# Patient Record
Sex: FEMALE | Race: WHITE | Employment: FULL TIME | URBAN - NONMETROPOLITAN AREA
[De-identification: names, ages, dates, MRNs, and addresses within clinical notes are randomized per-mention and may not be internally consistent; named-entity substitution may affect disease eponyms.]

---

## 2017-05-15 ENCOUNTER — APPOINTMENT (OUTPATIENT)
Dept: GENERAL RADIOLOGY | Age: 27
End: 2017-05-15
Payer: OTHER MISCELLANEOUS

## 2017-05-15 ENCOUNTER — HOSPITAL ENCOUNTER (EMERGENCY)
Age: 27
Discharge: HOME | End: 2017-05-15
Payer: OTHER MISCELLANEOUS

## 2017-05-15 VITALS
DIASTOLIC BLOOD PRESSURE: 90 MMHG | TEMPERATURE: 98.2 F | HEART RATE: 130 BPM | RESPIRATION RATE: 20 BRPM | SYSTOLIC BLOOD PRESSURE: 143 MMHG | BODY MASS INDEX: 47.13 KG/M2 | HEIGHT: 63 IN | WEIGHT: 266 LBS | OXYGEN SATURATION: 97 %

## 2017-05-15 DIAGNOSIS — S50.02XA CONTUSION OF LEFT ELBOW, INITIAL ENCOUNTER: ICD-10-CM
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
## 2017-05-15 DIAGNOSIS — S16.1XXA CERVICAL STRAIN, INITIAL ENCOUNTER: ICD-10-CM

## 2017-05-15 DIAGNOSIS — S80.02XA CONTUSION OF LEFT KNEE, INITIAL ENCOUNTER: ICD-10-CM

## 2017-05-15 PROCEDURE — 73560 X-RAY EXAM OF KNEE 1 OR 2: CPT

## 2017-05-15 PROCEDURE — 99284 EMERGENCY DEPT VISIT MOD MDM: CPT

## 2017-05-15 PROCEDURE — 99283 EMERGENCY DEPT VISIT LOW MDM: CPT | Performed by: PHYSICIAN ASSISTANT

## 2017-05-15 PROCEDURE — 96372 THER/PROPH/DIAG INJ SC/IM: CPT

## 2017-05-15 PROCEDURE — 73080 X-RAY EXAM OF ELBOW: CPT

## 2017-05-15 PROCEDURE — 6360000002 HC RX W HCPCS: Performed by: PHYSICIAN ASSISTANT

## 2017-05-15 PROCEDURE — 72040 X-RAY EXAM NECK SPINE 2-3 VW: CPT

## 2017-05-15 RX ORDER — ORPHENADRINE CITRATE 30 MG/ML
60 INJECTION INTRAMUSCULAR; INTRAVENOUS ONCE
Status: COMPLETED | OUTPATIENT
Start: 2017-05-15 | End: 2017-05-15

## 2017-05-15 RX ORDER — PHENTERMINE HYDROCHLORIDE 37.5 MG/1
37.5 CAPSULE ORAL EVERY MORNING
COMMUNITY

## 2017-05-15 RX ORDER — ONDANSETRON 4 MG/1
4 TABLET, ORALLY DISINTEGRATING ORAL EVERY 8 HOURS PRN
Qty: 15 TABLET | Refills: 0 | Status: SHIPPED | OUTPATIENT
Start: 2017-05-15

## 2017-05-15 RX ORDER — ETONOGESTREL AND ETHINYL ESTRADIOL 11.7; 2.7 MG/1; MG/1
1 INSERT, EXTENDED RELEASE VAGINAL SEE ADMIN INSTRUCTIONS
COMMUNITY

## 2017-05-15 RX ORDER — CYCLOBENZAPRINE HCL 10 MG
10 TABLET ORAL 3 TIMES DAILY PRN
Qty: 15 TABLET | Refills: 0 | Status: SHIPPED | OUTPATIENT
Start: 2017-05-15

## 2017-05-15 RX ORDER — OXYCODONE HYDROCHLORIDE AND ACETAMINOPHEN 5; 325 MG/1; MG/1
1 TABLET ORAL EVERY 4 HOURS PRN
Qty: 12 TABLET | Refills: 0 | Status: SHIPPED | OUTPATIENT
Start: 2017-05-15

## 2017-05-15 RX ORDER — NAPROXEN 500 MG/1
500 TABLET ORAL 2 TIMES DAILY
Qty: 20 TABLET | Refills: 0 | Status: SHIPPED | OUTPATIENT
Start: 2017-05-15

## 2017-05-15 RX ADMIN — ORPHENADRINE CITRATE 60 MG: 30 INJECTION INTRAMUSCULAR; INTRAVENOUS at 18:58

## 2017-05-15 ASSESSMENT — ENCOUNTER SYMPTOMS
ABDOMINAL PAIN: 0
SHORTNESS OF BREATH: 0
COUGH: 0
VOMITING: 0
BACK PAIN: 0
CONSTIPATION: 0
WHEEZING: 0
DIARRHEA: 0
NAUSEA: 0

## 2017-09-12 RX ORDER — ETONOGESTREL/ETHINYL ESTRADIOL .12-.015MG
RING, VAGINAL VAGINAL
Qty: 1 EACH | Refills: 0 | OUTPATIENT
Start: 2017-09-12

## 2017-09-13 RX ORDER — ETONOGESTREL AND ETHINYL ESTRADIOL 11.7; 2.7 MG/1; MG/1
1 INSERT, EXTENDED RELEASE VAGINAL
Qty: 1 EACH | Refills: 0 | Status: SHIPPED | OUTPATIENT
Start: 2017-09-13 | End: 2017-10-13 | Stop reason: SDUPTHER

## 2017-10-13 ENCOUNTER — DOCUMENTATION (OUTPATIENT)
Dept: OBSTETRICS AND GYNECOLOGY | Facility: CLINIC | Age: 27
End: 2017-10-13

## 2017-10-13 DIAGNOSIS — Z30.015 ENCOUNTER FOR INITIAL PRESCRIPTION OF VAGINAL RING HORMONAL CONTRACEPTIVE: Primary | ICD-10-CM

## 2017-10-13 RX ORDER — ETONOGESTREL AND ETHINYL ESTRADIOL 11.7; 2.7 MG/1; MG/1
1 INSERT, EXTENDED RELEASE VAGINAL
Qty: 1 EACH | Refills: 0 | Status: SHIPPED | OUTPATIENT
Start: 2017-10-13 | End: 2017-11-21 | Stop reason: SDUPTHER

## 2017-10-27 ENCOUNTER — OFFICE VISIT (OUTPATIENT)
Dept: OBSTETRICS AND GYNECOLOGY | Facility: CLINIC | Age: 27
End: 2017-10-27

## 2017-10-27 VITALS
SYSTOLIC BLOOD PRESSURE: 142 MMHG | DIASTOLIC BLOOD PRESSURE: 76 MMHG | HEIGHT: 65 IN | WEIGHT: 292 LBS | BODY MASS INDEX: 48.65 KG/M2

## 2017-10-27 DIAGNOSIS — Z01.419 ENCOUNTER FOR GYNECOLOGICAL EXAMINATION WITHOUT ABNORMAL FINDING: Primary | ICD-10-CM

## 2017-10-27 DIAGNOSIS — Z11.3 SCREEN FOR STD (SEXUALLY TRANSMITTED DISEASE): ICD-10-CM

## 2017-10-27 PROCEDURE — 87591 N.GONORRHOEAE DNA AMP PROB: CPT | Performed by: NURSE PRACTITIONER

## 2017-10-27 PROCEDURE — 87491 CHLMYD TRACH DNA AMP PROBE: CPT | Performed by: NURSE PRACTITIONER

## 2017-10-27 PROCEDURE — 99395 PREV VISIT EST AGE 18-39: CPT | Performed by: NURSE PRACTITIONER

## 2017-10-27 PROCEDURE — G0123 SCREEN CERV/VAG THIN LAYER: HCPCS | Performed by: NURSE PRACTITIONER

## 2017-10-27 PROCEDURE — 1036F TOBACCO NON-USER: CPT | Performed by: NURSE PRACTITIONER

## 2017-10-27 PROCEDURE — 3008F BODY MASS INDEX DOCD: CPT | Performed by: NURSE PRACTITIONER

## 2017-10-27 PROCEDURE — 87661 TRICHOMONAS VAGINALIS AMPLIF: CPT | Performed by: NURSE PRACTITIONER

## 2017-10-27 NOTE — PROGRESS NOTES
"Kesha Rothman is a 27 y.o. female.     HPI Comments: Annual exam    The following portions of the patient's history were reviewed and updated as appropriate: allergies, current medications, past family history, past medical history, past social history, past surgical history and problem list.    /76  Ht 65\" (165.1 cm)  Wt 292 lb (132 kg)  LMP 10/13/2017 (Approximate)  BMI 48.59 kg/m2    Review of Systems   Constitutional: Negative for activity change, appetite change, fatigue and fever.   HENT: Negative for congestion, sore throat and trouble swallowing.    Eyes: Negative for pain, discharge and visual disturbance.   Respiratory: Negative for apnea, shortness of breath and wheezing.    Cardiovascular: Negative for chest pain, palpitations and leg swelling.   Gastrointestinal: Negative for abdominal pain, constipation and diarrhea.   Genitourinary: Negative for frequency, pelvic pain, urgency and vaginal discharge.   Musculoskeletal: Negative for back pain and gait problem.   Skin: Negative for color change and rash.   Neurological: Negative for dizziness, weakness and numbness.   Psychiatric/Behavioral: Negative for confusion and sleep disturbance.       Objective   Physical Exam   Constitutional: She is oriented to person, place, and time. She appears well-developed and well-nourished. No distress.   HENT:   Head: Normocephalic.   Right Ear: External ear normal.   Left Ear: External ear normal.   Nose: Nose normal.   Mouth/Throat: Oropharynx is clear and moist.   Eyes: Conjunctivae are normal. Right eye exhibits no discharge. Left eye exhibits no discharge. No scleral icterus.   Neck: Normal range of motion. Neck supple. Carotid bruit is not present. No tracheal deviation present. No thyromegaly present.   Cardiovascular: Normal rate, regular rhythm, normal heart sounds and intact distal pulses.    No murmur heard.  Pulmonary/Chest: Effort normal and breath sounds normal. No respiratory " distress. She has no wheezes. Right breast exhibits no inverted nipple, no mass, no nipple discharge, no skin change and no tenderness. Left breast exhibits no inverted nipple, no mass, no nipple discharge, no skin change and no tenderness. Breasts are symmetrical. There is no breast swelling.   Abdominal: Soft. She exhibits no distension and no mass. There is no tenderness. There is no guarding. No hernia. Hernia confirmed negative in the right inguinal area and confirmed negative in the left inguinal area.   Genitourinary: Rectum normal, vagina normal and uterus normal. Rectal exam shows no mass. No breast tenderness, discharge or bleeding. Pelvic exam was performed with patient supine. There is no rash, tenderness, lesion or injury on the right labia. There is no rash, tenderness, lesion or injury on the left labia. Uterus is not enlarged, not fixed and not tender. Cervix exhibits no motion tenderness, no discharge and no friability. Right adnexum displays no mass, no tenderness and no fullness. Left adnexum displays no mass, no tenderness and no fullness. No erythema, tenderness or bleeding in the vagina. No foreign body in the vagina. No signs of injury around the vagina. No vaginal discharge found.   Genitourinary Comments:   BSU normal  Urethral meatus  Normal  Perineum  Normal   Musculoskeletal: Normal range of motion. She exhibits no edema or tenderness.   Lymphadenopathy:        Head (right side): No submental, no submandibular, no tonsillar, no preauricular, no posterior auricular and no occipital adenopathy present.        Head (left side): No submental, no submandibular, no tonsillar, no preauricular, no posterior auricular and no occipital adenopathy present.     She has no cervical adenopathy.        Right cervical: No superficial cervical, no deep cervical and no posterior cervical adenopathy present.       Left cervical: No superficial cervical, no deep cervical and no posterior cervical adenopathy  present.     She has no axillary adenopathy.        Right: No inguinal adenopathy present.        Left: No inguinal adenopathy present.   Neurological: She is alert and oriented to person, place, and time. Coordination normal.   Skin: Skin is warm and dry. No bruising and no rash noted. She is not diaphoretic. No erythema.   Psychiatric: She has a normal mood and affect. Her behavior is normal. Judgment and thought content normal.   Nursing note and vitals reviewed.      Assessment/Plan    Well woman exam. Pap collected, STD3 added.   Discussed BC options, risks and benefits.    RV annual exam.     Jinny was seen today for gynecologic exam.    Diagnoses and all orders for this visit:    Encounter for gynecological examination without abnormal finding  -     Liquid-based Pap Smear, Screening - ThinPrep Vial, Cervix    Screen for STD (sexually transmitted disease)  -     Liquid-based Pap Smear, Screening - ThinPrep Vial, Cervix  -     Hepatitis Panel, Acute  -     HIV-1 / O / 2 Ag / Antibody 4th Generation  -     HSV 1 & 2 IgM, Antibodies, Indirect  -     HSV 1 & 2 - Specific Antibody, IgG  -     RPR

## 2017-10-30 LAB
HAV IGM SERPL QL IA: NEGATIVE
HBV CORE IGM SERPL QL IA: NEGATIVE
HBV SURFACE AG SERPL QL IA: NEGATIVE
HCV AB S/CO SERPL IA: <0.1 S/CO RATIO (ref 0–0.9)
HIV 1+2 AB+HIV1 P24 AG SERPL QL IA: NON REACTIVE
HSV1 IGG SER IA-ACNC: 46.2 INDEX (ref 0–0.9)
HSV1 IGM TITR SER IF: NORMAL TITER
HSV2 IGG SER IA-ACNC: <0.91 INDEX (ref 0–0.9)
HSV2 IGM TITR SER IF: NORMAL TITER
RPR SER QL: NON REACTIVE

## 2017-11-02 LAB
GEN CATEG CVX/VAG CYTO-IMP: ABNORMAL
LAB AP CASE REPORT: ABNORMAL
LAB AP GYN ADDITIONAL INFORMATION: ABNORMAL
Lab: ABNORMAL
PATH INTERP SPEC-IMP: ABNORMAL
STAT OF ADQ CVX/VAG CYTO-IMP: ABNORMAL

## 2017-11-21 RX ORDER — ETONOGESTREL/ETHINYL ESTRADIOL .12-.015MG
RING, VAGINAL VAGINAL
Qty: 1 EACH | Refills: 0 | Status: SHIPPED | OUTPATIENT
Start: 2017-11-21 | End: 2018-01-02 | Stop reason: SDUPTHER

## 2017-11-21 NOTE — TELEPHONE ENCOUNTER
Please review RX request. Last visit says discussed RBO of contraceptive methods but does not indicate what she would use.

## 2017-12-04 ENCOUNTER — OFFICE VISIT (OUTPATIENT)
Dept: OBSTETRICS AND GYNECOLOGY | Facility: CLINIC | Age: 27
End: 2017-12-04

## 2017-12-04 VITALS
BODY MASS INDEX: 48.82 KG/M2 | SYSTOLIC BLOOD PRESSURE: 130 MMHG | DIASTOLIC BLOOD PRESSURE: 80 MMHG | HEIGHT: 65 IN | WEIGHT: 293 LBS

## 2017-12-04 DIAGNOSIS — R87.622 PAP SMEAR OF VAGINA WITH LGSIL: Primary | ICD-10-CM

## 2017-12-04 LAB
B-HCG UR QL: NEGATIVE
INTERNAL NEGATIVE CONTROL: NORMAL
INTERNAL POSITIVE CONTROL: NORMAL
Lab: NORMAL

## 2017-12-04 PROCEDURE — 57454 BX/CURETT OF CERVIX W/SCOPE: CPT | Performed by: OBSTETRICS & GYNECOLOGY

## 2017-12-04 PROCEDURE — 88305 TISSUE EXAM BY PATHOLOGIST: CPT | Performed by: OBSTETRICS & GYNECOLOGY

## 2017-12-04 NOTE — PROGRESS NOTES
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  Prior cervical treatment: no treatment.    Procedure Details   The risks and benefits of the procedure and Written informed consent obtained.    Speculum placed in vagina and excellent visualization of cervix achieved, cervix swabbed x 3 with acetic acid solution.    Findings:  Cervix: visible lesion(s) at 6 o'clock o'clock; SCJ visualized - lesion at 6 o'clock, Benzocaine 20% spray applied to cervix, endocervical curettage performed, cervical biopsies taken at 6 o'clock, specimen labelled and sent to pathology and hemostasis achieved with Monsel's solution.  Vaginal inspection: vaginal colposcopy not performed.  Vulvar colposcopy: vulvar colposcopy not performed.    Specimens: Endocervical curettings                       Cervical biopsy at 6 o'clock    IMP:KARMEN I    Complications: none.    Plan:  Specimens labelled and sent to Pathology.  Will base further treatment on Pathology findings.  Post biopsy instructions given to patient.

## 2017-12-06 LAB
CYTO UR: NORMAL
CYTO UR: NORMAL
LAB AP CASE REPORT: NORMAL
LAB AP CASE REPORT: NORMAL
LAB AP CLINICAL INFORMATION: NORMAL
LAB AP CLINICAL INFORMATION: NORMAL
Lab: NORMAL
Lab: NORMAL
PATH REPORT.FINAL DX SPEC: NORMAL
PATH REPORT.FINAL DX SPEC: NORMAL
PATH REPORT.GROSS SPEC: NORMAL
PATH REPORT.GROSS SPEC: NORMAL

## 2018-01-02 ENCOUNTER — TRANSCRIBE ORDERS (OUTPATIENT)
Dept: ADMINISTRATIVE | Facility: HOSPITAL | Age: 28
End: 2018-01-02

## 2018-01-02 ENCOUNTER — OFFICE VISIT (OUTPATIENT)
Dept: RETAIL CLINIC | Facility: CLINIC | Age: 28
End: 2018-01-02

## 2018-01-02 ENCOUNTER — LAB (OUTPATIENT)
Dept: LAB | Facility: HOSPITAL | Age: 28
End: 2018-01-02
Attending: PEDIATRICS

## 2018-01-02 DIAGNOSIS — Z53.21 PATIENT LEFT WITHOUT BEING SEEN: Primary | ICD-10-CM

## 2018-01-02 DIAGNOSIS — R19.7 DIARRHEA, UNSPECIFIED TYPE: ICD-10-CM

## 2018-01-02 DIAGNOSIS — R19.7 DIARRHEA, UNSPECIFIED TYPE: Primary | ICD-10-CM

## 2018-01-02 LAB
FLUAV AG NPH QL: NEGATIVE
FLUBV AG NPH QL IA: NEGATIVE

## 2018-01-02 PROCEDURE — 87804 INFLUENZA ASSAY W/OPTIC: CPT

## 2018-01-02 RX ORDER — ETONOGESTREL AND ETHINYL ESTRADIOL 11.7; 2.7 MG/1; MG/1
INSERT, EXTENDED RELEASE VAGINAL
Qty: 1 EACH | Refills: 2 | Status: SHIPPED | OUTPATIENT
Start: 2018-01-02 | End: 2018-03-13

## 2018-03-13 ENCOUNTER — OFFICE VISIT (OUTPATIENT)
Dept: OBSTETRICS AND GYNECOLOGY | Facility: CLINIC | Age: 28
End: 2018-03-13

## 2018-03-13 VITALS
WEIGHT: 293 LBS | BODY MASS INDEX: 48.82 KG/M2 | SYSTOLIC BLOOD PRESSURE: 128 MMHG | HEIGHT: 65 IN | DIASTOLIC BLOOD PRESSURE: 74 MMHG

## 2018-03-13 DIAGNOSIS — Z31.9 PATIENT DESIRES PREGNANCY: ICD-10-CM

## 2018-03-13 DIAGNOSIS — Z78.9 NONSMOKER: ICD-10-CM

## 2018-03-13 PROCEDURE — 99214 OFFICE O/P EST MOD 30 MIN: CPT | Performed by: OBSTETRICS & GYNECOLOGY

## 2018-03-13 NOTE — PROGRESS NOTES
"Subjective   Chief Complaint   Patient presents with   • Follow-up     Pt is here to discuss having IUI done.      Jinny Rothman is a 27 y.o. year old .  Patient's last menstrual period was 2018 (approximate).  She presents, with her mother, to be seen because patient interested in \"artificial insemination\".  The patient is currently single, and is interested in being pregnant on her own.  The patient has never tried to get pregnant, but has also never used contraception.  She reports regular menses every 28 days.  Flow lasts for 4 days, is moderate-to-heavy, but not painful.    The following portions of the patient's history were reviewed and updated as appropriate:current medications and allergies    Smoking status: Never Smoker                                                              Smokeless tobacco: Never Used                        Review of Systems   Constitutional: Negative for activity change and unexpected weight change.   Respiratory: Negative for shortness of breath.    Cardiovascular: Negative for chest pain.   Gastrointestinal: Negative for abdominal pain.   Genitourinary: Negative for menstrual problem, vaginal bleeding, vaginal discharge and vaginal pain.   Psychiatric/Behavioral: The patient is nervous/anxious.          Objective   /74   Ht 165.1 cm (65\")   Wt (!) 137 kg (302 lb)   LMP 2018 (Approximate)   BMI 50.26 kg/m²     Physical Exam   Constitutional: She is oriented to person, place, and time. She appears well-developed and well-nourished. No distress.   HENT:   Head: Normocephalic and atraumatic.   Eyes: EOM are normal.   Neck: Normal range of motion.   Pulmonary/Chest: Effort normal.   Musculoskeletal: Normal range of motion.   Neurological: She is alert and oriented to person, place, and time.   Skin: Skin is warm and dry.   Psychiatric: She has a normal mood and affect. Her behavior is normal. Judgment normal.   Nursing note and vitals reviewed.      Lab " "Review   STD testing from several months ago    Imaging   No data reviewed     Assessment & Plan    Jinny was seen today for follow-up.    Diagnoses and all orders for this visit:    BMI 50.0-59.9, adult: Patient reports that she has \"always been heavy\".  She lost 50 pounds last year with [facility], but has gained most of it back.  She is willing to consider bariatric surgery since she has already lost and re-gained weight with other methods.  Referral made.  -     Ambulatory Referral to Bariatric Surgery  -     CBC & Differential  -     Comprehensive Metabolic Panel  -     Hemoglobin A1c  -     Lipid Panel With LDL / HDL Ratio    Patient desires pregnancy: Process of IUI discussed; patient has already looked into online sperm lozada.  Patient strongly encouraged to consider dedicating a year to weight loss in hopes that she might be able to get down to 200 or 220# since this would decrease risk of C/S, GDM, Preeclampsia and stillbirth.  Patient advised that we could proceed with attempt at pregnancy whenever she is ready, but that she would have a healthier pregnancy if she lost some weight first.  She is agreeable to seeing Dr. Spencer, and then she will decide whether to start by focusing on weight loss in preparation for pregnancy, or whether she does not think that is a realistic goal for her.  The patient was instructed to go ahead and use an ovulation predictor kit for several months in a row in order to help her plan when she would need to order sperm in future IUI cycles.  Questions answered.  Return to office prn.    Nonsmoker      This note was electronically signed.    Lexie Yang MD  March 13, 2018  1:19 PM    Total time spent today with Jinny  was 30 minutes (level 4).  Greater than 50% of the time was spent coordinating care, answering her questions and counseling regarding pathophysiology of her presenting problem along with plans for any diagnositc work-up and treatment.      "

## 2018-03-14 LAB
ALBUMIN SERPL-MCNC: 3.8 G/DL (ref 3.5–5)
ALBUMIN/GLOB SERPL: 1.3 G/DL (ref 1.1–2.5)
ALP SERPL-CCNC: 98 U/L (ref 24–120)
ALT SERPL-CCNC: 39 U/L (ref 0–54)
AST SERPL-CCNC: 23 U/L (ref 7–45)
BASOPHILS # BLD AUTO: 0.03 10*3/MM3 (ref 0–0.2)
BASOPHILS NFR BLD AUTO: 0.4 % (ref 0–2)
BILIRUB SERPL-MCNC: 0.3 MG/DL (ref 0.1–1)
BUN SERPL-MCNC: 9 MG/DL (ref 5–21)
BUN/CREAT SERPL: 13 (ref 7–25)
CALCIUM SERPL-MCNC: 9.5 MG/DL (ref 8.4–10.4)
CHLORIDE SERPL-SCNC: 101 MMOL/L (ref 98–110)
CHOLEST SERPL-MCNC: 129 MG/DL (ref 130–200)
CO2 SERPL-SCNC: 27 MMOL/L (ref 24–31)
CREAT SERPL-MCNC: 0.69 MG/DL (ref 0.5–1.4)
EOSINOPHIL # BLD AUTO: 0.12 10*3/MM3 (ref 0–0.7)
EOSINOPHIL NFR BLD AUTO: 1.7 % (ref 0–4)
ERYTHROCYTE [DISTWIDTH] IN BLOOD BY AUTOMATED COUNT: 13.2 % (ref 12–15)
GFR SERPLBLD CREATININE-BSD FMLA CKD-EPI: 104 ML/MIN/1.73
GFR SERPLBLD CREATININE-BSD FMLA CKD-EPI: 126 ML/MIN/1.73
GLOBULIN SER CALC-MCNC: 3 GM/DL
GLUCOSE SERPL-MCNC: 97 MG/DL (ref 70–100)
HBA1C MFR BLD: 5.4 %
HCT VFR BLD AUTO: 40.5 % (ref 37–47)
HDLC SERPL-MCNC: 38 MG/DL
HGB BLD-MCNC: 13 G/DL (ref 12–16)
IMM GRANULOCYTES # BLD: 0.02 10*3/MM3 (ref 0–0.03)
IMM GRANULOCYTES NFR BLD: 0.3 % (ref 0–5)
LDLC SERPL CALC-MCNC: 76 MG/DL (ref 0–99)
LDLC/HDLC SERPL: 2 {RATIO}
LYMPHOCYTES # BLD AUTO: 3.07 10*3/MM3 (ref 0.72–4.86)
LYMPHOCYTES NFR BLD AUTO: 43.6 % (ref 15–45)
MCH RBC QN AUTO: 27.8 PG (ref 28–32)
MCHC RBC AUTO-ENTMCNC: 32.1 G/DL (ref 33–36)
MCV RBC AUTO: 86.7 FL (ref 82–98)
MONOCYTES # BLD AUTO: 0.46 10*3/MM3 (ref 0.19–1.3)
MONOCYTES NFR BLD AUTO: 6.5 % (ref 4–12)
NEUTROPHILS # BLD AUTO: 3.34 10*3/MM3 (ref 1.87–8.4)
NEUTROPHILS NFR BLD AUTO: 47.5 % (ref 39–78)
NRBC BLD AUTO-RTO: 0 /100 WBC (ref 0–0)
PLATELET # BLD AUTO: 330 10*3/MM3 (ref 130–400)
POTASSIUM SERPL-SCNC: 4.1 MMOL/L (ref 3.5–5.3)
PROT SERPL-MCNC: 6.8 G/DL (ref 6.3–8.7)
RBC # BLD AUTO: 4.67 10*6/MM3 (ref 4.2–5.4)
SODIUM SERPL-SCNC: 141 MMOL/L (ref 135–145)
TRIGL SERPL-MCNC: 75 MG/DL (ref 0–149)
VLDLC SERPL CALC-MCNC: 15 MG/DL
WBC # BLD AUTO: 7.04 10*3/MM3 (ref 4.8–10.8)

## 2018-04-10 ENCOUNTER — LAB (OUTPATIENT)
Dept: LAB | Facility: HOSPITAL | Age: 28
End: 2018-04-10
Attending: NURSE PRACTITIONER

## 2018-04-10 ENCOUNTER — OFFICE VISIT (OUTPATIENT)
Dept: BARIATRICS/WEIGHT MGMT | Facility: HOSPITAL | Age: 28
End: 2018-04-10

## 2018-04-10 ENCOUNTER — OFFICE VISIT (OUTPATIENT)
Dept: BARIATRICS/WEIGHT MGMT | Facility: CLINIC | Age: 28
End: 2018-04-10

## 2018-04-10 ENCOUNTER — TELEPHONE (OUTPATIENT)
Dept: BARIATRICS/WEIGHT MGMT | Facility: CLINIC | Age: 28
End: 2018-04-10

## 2018-04-10 VITALS
HEIGHT: 64 IN | OXYGEN SATURATION: 100 % | SYSTOLIC BLOOD PRESSURE: 145 MMHG | WEIGHT: 293 LBS | HEART RATE: 114 BPM | TEMPERATURE: 99.6 F | DIASTOLIC BLOOD PRESSURE: 94 MMHG | BODY MASS INDEX: 50.02 KG/M2

## 2018-04-10 DIAGNOSIS — E66.01 OBESITY, MORBID, BMI 50 OR HIGHER (HCC): ICD-10-CM

## 2018-04-10 DIAGNOSIS — R53.83 FATIGUE, UNSPECIFIED TYPE: ICD-10-CM

## 2018-04-10 DIAGNOSIS — Z13.21 MALNUTRITION SCREEN: ICD-10-CM

## 2018-04-10 DIAGNOSIS — E66.01 OBESITY, MORBID, BMI 50 OR HIGHER (HCC): Primary | ICD-10-CM

## 2018-04-10 DIAGNOSIS — R03.0 ELEVATED BLOOD-PRESSURE READING WITHOUT DIAGNOSIS OF HYPERTENSION: ICD-10-CM

## 2018-04-10 DIAGNOSIS — R63.5 WEIGHT GAIN: ICD-10-CM

## 2018-04-10 LAB
25(OH)D3 SERPL-MCNC: 22.8 NG/ML (ref 30–100)
TSH SERPL DL<=0.05 MIU/L-ACNC: 0.75 MIU/ML (ref 0.47–4.68)
VIT B12 BLD-MCNC: 803 PG/ML (ref 239–931)

## 2018-04-10 PROCEDURE — 84443 ASSAY THYROID STIM HORMONE: CPT | Performed by: NURSE PRACTITIONER

## 2018-04-10 PROCEDURE — 82607 VITAMIN B-12: CPT | Performed by: NURSE PRACTITIONER

## 2018-04-10 PROCEDURE — 97802 MEDICAL NUTRITION INDIV IN: CPT

## 2018-04-10 PROCEDURE — 99204 OFFICE O/P NEW MOD 45 MIN: CPT | Performed by: NURSE PRACTITIONER

## 2018-04-10 PROCEDURE — 36415 COLL VENOUS BLD VENIPUNCTURE: CPT

## 2018-04-10 PROCEDURE — 82306 VITAMIN D 25 HYDROXY: CPT | Performed by: NURSE PRACTITIONER

## 2018-04-10 RX ORDER — ERGOCALCIFEROL 1.25 MG/1
50000 CAPSULE ORAL WEEKLY
Qty: 4 CAPSULE | Refills: 2 | Status: SHIPPED | OUTPATIENT
Start: 2018-04-10 | End: 2018-08-13

## 2018-04-10 NOTE — PROGRESS NOTES
"Nutrition Bariatric/MWL Note     Visit   Initial Assessment     Anthropometrics   Height: 64\"  Weight: 299#  BMI: 51.3    Waist: 56\"  Hip: 61\"  Chest: 56\"  Thigh: 31\"  Arm: 19\"  Body Fat: >50%    Nutrition Recall  24 Hour recall:  (B) sausage biscuit or chicken biscuit, orange juice (L) chicken sandwich or burger, french fries OR steak fajita, sweet tea OR soda (D) cereal, sweet tea OR soda  Snacking:  apples  Eating 3 meals daily   Protein lacking at dinner meal  Limited sweet intake  Large portions  Drinking with meals   Drinking 4-5 carbonated beverages per day.  Recently decreased intake and replaced with juice  Drinking less than 64 fluid ounces    Exercise   None    Habits:  None    Education    Goal Setting and Information Packet    Nutrition Goals   Continue diet changes  Eat 3-4 meals per day with protein  Eat protein first at meals  Try sample protein drink given to pt / discussed protein guidelines for shakes and bar  Healthier food choices  Portion control / Use smaller plate or measuring cup   Eliminate soda  Replace sugar beverages with artifical sweetened   Increase fluid intake to 64 ounces per day    Exercise Goals  Add 15-30 minutes of walking, cycling, elliptical, swimming, chair, yoga or crossfit daily    Merari Osman RD, LD  04/10/2018  9:27 AM    "

## 2018-04-10 NOTE — TELEPHONE ENCOUNTER
Vitamin D level is low.  Will send prescription in for 50,000 units to be taken once a week for the next 3 months.  We will recheck level in 3 months.  Vitamin B12 and TSH level are normal. Notified by phone and [employer] message.

## 2018-04-10 NOTE — PROGRESS NOTES
"Subjective   Jinny Rothman is a 27 y.o. female.     History of Present Illness   Jinny Rothman is a 27 y.o. year-old who has morbid obesity and is interested in having surgical weight loss. Patient has been overweight for the past 12-15 years. The most weight ever lost was 55 pounds from going to [facility]. She was able to keep the weight off for about 3 months. Unsupervised diet attempts include: calorie counting.  Supervised diet attempts include: medically supervised weight loss with [facility]. Patient has tried the following OTC or prescription medications for weight loss: phentermine. Patient states she tends to eat due to physical hunger and boredom. Patient is limited in activities due to: back pain can limit her sometimes. She was referred here by Dr. Yang, OB/GYN, as she is needing to lose weight prior to artifical insemination.   Vitals:    04/10/18 0916   BP: 145/94   BP Location: Right arm   Patient Position: Sitting   Cuff Size: Adult   Pulse: 114   Temp: 99.6 °F (37.6 °C)   SpO2: 100%   Weight: 136 kg (299 lb)   Height: 162.6 cm (64\")     She  has a past medical history of Anxiety; Back pain; Candidiasis of vulva and vagina; Constipation; Contraception; Depression; GERD (gastroesophageal reflux disease); Migraine; Visit for routine gyn exam; and White coat syndrome without diagnosis of hypertension.  She  does not have a problem list on file.  She  has a past surgical history that includes Other surgical history; Pap Smear (07/23/2012); Java tooth extraction; and Cholecystectomy.  Her family history includes Depression in her mother; Diabetes in her father and other; Hyperlipidemia in her father; Hypertension in her father and mother.  She  reports that she has never smoked. She has never used smokeless tobacco. She reports that she drinks alcohol. She reports that she does not use drugs.  No current outpatient prescriptions on file.     No current facility-administered medications " for this visit.      She is allergic to amoxicillin and penicillins.      The following portions of the patient's history were reviewed and updated as appropriate: allergies, current medications, past family history, past medical history, past social history, past surgical history and problem list.    Review of Systems   Constitutional: Positive for fatigue and unexpected weight change. Negative for activity change and appetite change.   HENT: Positive for postnasal drip.    Eyes: Negative.         Wears corrective lenses   Respiratory: Negative.  Negative for apnea, cough, chest tightness and shortness of breath.    Cardiovascular: Negative.  Negative for chest pain, palpitations and leg swelling.   Gastrointestinal: Positive for constipation and diarrhea. Negative for abdominal pain, anal bleeding, nausea and vomiting.        Heartburn, mild; IBS   Endocrine: Negative.    Genitourinary: Negative.         Pap smear is up to date; menstrual cycle is up to date   Musculoskeletal: Positive for arthralgias, back pain and neck pain.   Skin: Positive for rash.        Under skin folds   Allergic/Immunologic: Negative.    Neurological: Negative.  Negative for seizures and syncope.   Hematological: Negative.  Does not bruise/bleed easily.   Psychiatric/Behavioral: Positive for dysphoric mood. Negative for self-injury, sleep disturbance and suicidal ideas. The patient is nervous/anxious.        Objective   Physical Exam   Constitutional: She is oriented to person, place, and time. Vital signs are normal. She appears well-developed and well-nourished. She is cooperative. No distress.   HENT:   Head: Normocephalic and atraumatic.   Nose: Nose normal.   Mouth/Throat: Oropharynx is clear and moist. No oropharyngeal exudate or tonsillar abscesses.   Eyes: Conjunctivae, EOM and lids are normal. Pupils are equal, round, and reactive to light. Right eye exhibits no discharge. Left eye exhibits no discharge.   Glasses noted   Neck:  Trachea normal. Neck supple. No JVD present. Carotid bruit is not present. No no neck rigidity. No tracheal deviation present. No thyromegaly present.   Cardiovascular: Normal rate, regular rhythm, S1 normal, S2 normal and normal heart sounds.    Pulmonary/Chest: Effort normal and breath sounds normal. No stridor. No respiratory distress. She has no wheezes. She has no rales.   Abdominal: Soft. Bowel sounds are normal. She exhibits no distension. There is no tenderness.   obese   Musculoskeletal: She exhibits no edema.        Right shoulder: She exhibits normal strength.   Lymphadenopathy:     She has no cervical adenopathy.   Neurological: She is alert and oriented to person, place, and time. She has normal strength. No cranial nerve deficit.   Skin: Skin is warm, dry and intact. No rash noted.   Psychiatric: She has a normal mood and affect. Her speech is normal and behavior is normal.   Alert and oriented x 3   Vitals reviewed.      Assessment/Plan   Jinny was seen today for obesity, nutrition counseling and weight loss.    Diagnoses and all orders for this visit:    Obesity, morbid, BMI 50 or higher  -     Bariatric Nutritional Counseling; Standing  -     TSH; Future  -     Vitamin B12; Future  -     Vitamin D 25 Hydroxy; Future    Elevated blood-pressure reading without diagnosis of hypertension    Fatigue, unspecified type  -     TSH; Future  -     Vitamin B12; Future  -     Vitamin D 25 Hydroxy; Future    Weight gain  -     TSH; Future    Malnutrition screen  -     Vitamin B12; Future  -     Vitamin D 25 Hydroxy; Future          Jinny Stephan is a  27 y.o. who has morbid obesity with BMI of 51.3 and desires surgical weight loss. Patient has been advised that this surgery is considered to be elective major surgery that is typically done laparoscopically. Patient is a potentially good surgical candidate. Patient will need to meet with the Bariatric Surgeon to further discuss surgical options. Patient has been  advised that they will need to have a work-up prior to surgery. This work-up will include an EGD to assess for H. Pylori and Raman's esophagus. Additionally, patient will need to have a psychological evaluation and clearance prior to surgery. Patient will need the following additional clearances/testing: cardiac (due to BMI >50). Baseline lab work will be obtained today. Patient will see the dietician today for make specific goals for diet, exercise, and lifestyle. Patient has received intensive behavioral therapy for obesity today. I will have them follow up in one month for a weight recheck and to further discuss options.

## 2018-04-10 NOTE — PATIENT INSTRUCTIONS
Jinny Rothman is a  27 y.o. who has morbid obesity with BMI of 51.3 and desires surgical weight loss. Patient has been advised that this surgery is considered to be elective major surgery that is typically done laparoscopically. Patient is a potentially good surgical candidate. Patient will need to meet with the Bariatric Surgeon to further discuss surgical options. Patient has been advised that they will need to have a work-up prior to surgery. This work-up will include an EGD to assess for H. Pylori and Raman's esophagus. Additionally, patient will need to have a psychological evaluation and clearance prior to surgery. Patient will need the following additional clearances/testing: cardiac (due to BMI >50). Baseline lab work will be obtained today. Patient will see the dietician today for make specific goals for diet, exercise, and lifestyle. Patient has received intensive behavioral therapy for obesity today. I will have them follow up in one month for a weight recheck and to further discuss options.

## 2018-05-04 ENCOUNTER — RESULTS ENCOUNTER (OUTPATIENT)
Dept: BARIATRICS/WEIGHT MGMT | Facility: CLINIC | Age: 28
End: 2018-05-04

## 2018-05-04 DIAGNOSIS — E66.01 OBESITY, MORBID, BMI 50 OR HIGHER (HCC): ICD-10-CM

## 2018-05-08 ENCOUNTER — OFFICE VISIT (OUTPATIENT)
Dept: BARIATRICS/WEIGHT MGMT | Facility: CLINIC | Age: 28
End: 2018-05-08

## 2018-05-08 ENCOUNTER — TELEPHONE (OUTPATIENT)
Dept: BARIATRICS/WEIGHT MGMT | Facility: CLINIC | Age: 28
End: 2018-05-08

## 2018-05-08 VITALS
OXYGEN SATURATION: 100 % | BODY MASS INDEX: 50.02 KG/M2 | WEIGHT: 293 LBS | TEMPERATURE: 98.6 F | HEART RATE: 86 BPM | SYSTOLIC BLOOD PRESSURE: 143 MMHG | DIASTOLIC BLOOD PRESSURE: 89 MMHG | HEIGHT: 64 IN

## 2018-05-08 DIAGNOSIS — E66.01 OBESITY, MORBID, BMI 50 OR HIGHER (HCC): Primary | ICD-10-CM

## 2018-05-08 DIAGNOSIS — E55.9 VITAMIN D DEFICIENCY: ICD-10-CM

## 2018-05-08 DIAGNOSIS — Z71.89 PRE-BARIATRIC SURGERY PSYCHOLOGICAL EVALUATION: ICD-10-CM

## 2018-05-08 DIAGNOSIS — R03.0 ELEVATED BLOOD PRESSURE READING WITHOUT DIAGNOSIS OF HYPERTENSION: ICD-10-CM

## 2018-05-08 PROCEDURE — 99213 OFFICE O/P EST LOW 20 MIN: CPT | Performed by: NURSE PRACTITIONER

## 2018-05-08 NOTE — PROGRESS NOTES
"Subjective   Jinny Rothman is a 27 y.o. female.     History of Present Illness   Jinny Rothman is here with morbid obesity and desires to have surgery for weight loss. This is the patient's 2nd visit to the office.  Patient will be made a psych referral today for evaluation and clearance. She was placed on Vitamin D supplement at last visit. That level will be rechecked in July.  Patient has been exercising by walking three days per week for 15 minutes. Patient has been making health dietary choices and eating 3 meals per day with protein at each. Patient has been eating at least 40 grams of protein per day. Patient is drinking 16 ounces of water per day. She has been drinking juice instead of Mt. Dew.   Vitals:    05/08/18 1104   BP: 143/89   BP Location: Left arm   Patient Position: Sitting   Cuff Size: Adult   Pulse: 86   Temp: 98.6 °F (37 °C)   SpO2: 100%   Weight: 135 kg (297 lb 3.2 oz)   Height: 162.6 cm (64\")         The following portions of the patient's history were reviewed and updated as appropriate: allergies, current medications, past family history, past medical history, past social history, past surgical history and problem list.    Review of Systems   Constitutional: Positive for fatigue. Negative for activity change and appetite change.   Eyes: Negative.    Respiratory: Negative.  Negative for apnea, cough, chest tightness and shortness of breath.    Cardiovascular: Negative.  Negative for chest pain, palpitations and leg swelling.   Gastrointestinal: Negative.  Negative for abdominal pain, anal bleeding, constipation, nausea and vomiting.        Heartburn   Endocrine: Negative.    Genitourinary: Negative.    Musculoskeletal: Positive for arthralgias, back pain and neck pain.   Skin: Negative.    Allergic/Immunologic: Negative.    Neurological: Positive for headaches. Negative for seizures and syncope.   Hematological: Negative.  Does not bruise/bleed easily.   Psychiatric/Behavioral: Positive for " dysphoric mood and sleep disturbance. Negative for self-injury and suicidal ideas.       Objective   Physical Exam   Constitutional: She is oriented to person, place, and time. Vital signs are normal. She appears well-developed and well-nourished. She is cooperative. No distress.   HENT:   Head: Normocephalic and atraumatic.   Nose: Nose normal.   Mouth/Throat: Oropharynx is clear and moist. No oropharyngeal exudate or tonsillar abscesses.   Eyes: Conjunctivae, EOM and lids are normal. Pupils are equal, round, and reactive to light. Right eye exhibits no discharge. Left eye exhibits no discharge.   Glasses noted   Neck: Trachea normal. Neck supple. No JVD present. Carotid bruit is not present. No no neck rigidity. No tracheal deviation present. No thyromegaly present.   Cardiovascular: Normal rate, regular rhythm, S1 normal, S2 normal and normal heart sounds.    Pulmonary/Chest: Effort normal and breath sounds normal. No stridor. No respiratory distress. She has no wheezes. She has no rales.   Abdominal: Soft. Bowel sounds are normal. She exhibits no distension. There is no tenderness.   obese   Musculoskeletal: She exhibits edema.        Right shoulder: She exhibits normal strength.   Trace edema to lower legs   Lymphadenopathy:     She has no cervical adenopathy.   Neurological: She is alert and oriented to person, place, and time. She has normal strength. No cranial nerve deficit.   Skin: Skin is warm, dry and intact. No rash noted.   Psychiatric: She has a normal mood and affect. Her speech is normal and behavior is normal.   Alert and oriented x 3   Vitals reviewed.      Assessment/Plan   Jinny was seen today for follow-up, obesity, nutrition counseling and weight loss.    Diagnoses and all orders for this visit:    Obesity, morbid, BMI 50 or higher  -     Ambulatory Referral to Psychology    Pre-bariatric surgery psychological evaluation  -     Ambulatory Referral to Psychology    Vitamin D  deficiency    Elevated blood pressure reading without diagnosis of hypertension      Jinny Rothman has done fairly well this month with healthy changes. Patient has lost 2 pounds.   Today we discussed healthy changes in lifestyle, diet, and exercise.   Handout provided on portion sizes/control/reading nutrition labels.   Intensive behavioral therapy for obesity was done today.   Goals for this month are: 3 meals per day with protein at each; eat protein first, use smaller plate; exercise as advised(increase to 4 days per week). Stop drinking juice. Drink at least 64 ounces of water per day.   Keep taking Vitamin D supplement. Will recheck level in July.   Psych referral has been made today for evaluation and clearance. Office will arrange.  Will need cardiac evaluation and clearance prior to surgery.  Monitor blood pressure at home. May need to see PCP for treatment of HTN if remains running high.   Follow up in one month with Dr. Spencer to discuss EGD and surgery options.

## 2018-05-08 NOTE — PATIENT INSTRUCTIONS
Jinny Rothman has done fairly well this month with healthy changes. Patient has lost 2 pounds.   Today we discussed healthy changes in lifestyle, diet, and exercise.   Handout provided on portion sizes/control/reading nutrition labels.   Intensive behavioral therapy for obesity was done today.   Goals for this month are: 3 meals per day with protein at each; eat protein first, use smaller plate; exercise as advised(increase to 4 days per week). Stop drinking juice. Drink at least 64 ounces of water per day.   Keep taking Vitamin D supplement. Will recheck level in July.   Psych referral has been made today for evaluation and clearance. Office will arrange.  Will need cardiac evaluation and clearance prior to surgery.  Monitor blood pressure at home. May need to see PCP for treatment of HTN if remains running high.   Follow up in one month with Dr. Spencer to discuss EGD and surgery options.

## 2018-05-08 NOTE — TELEPHONE ENCOUNTER
Pt was notified of her appt with [facility] on 05/25/2018 at 9:30. She was agreeable to date and time

## 2018-05-29 ENCOUNTER — OFFICE VISIT (OUTPATIENT)
Dept: PSYCHIATRY | Age: 28
End: 2018-05-29
Payer: COMMERCIAL

## 2018-05-29 VITALS
BODY MASS INDEX: 49.51 KG/M2 | HEIGHT: 64 IN | WEIGHT: 290 LBS | HEART RATE: 82 BPM | OXYGEN SATURATION: 100 % | DIASTOLIC BLOOD PRESSURE: 99 MMHG | SYSTOLIC BLOOD PRESSURE: 147 MMHG

## 2018-05-29 DIAGNOSIS — F43.20 ADJUSTMENT DISORDER, UNSPECIFIED TYPE: Primary | ICD-10-CM

## 2018-05-29 PROCEDURE — 90791 PSYCH DIAGNOSTIC EVALUATION: CPT | Performed by: COUNSELOR

## 2018-05-29 RX ORDER — ERGOCALCIFEROL (VITAMIN D2) 1250 MCG
50000 CAPSULE ORAL
COMMUNITY
Start: 2018-04-10

## 2018-06-01 ENCOUNTER — RESULTS ENCOUNTER (OUTPATIENT)
Dept: BARIATRICS/WEIGHT MGMT | Facility: CLINIC | Age: 28
End: 2018-06-01

## 2018-06-01 DIAGNOSIS — E66.01 OBESITY, MORBID, BMI 50 OR HIGHER (HCC): ICD-10-CM

## 2018-06-04 ENCOUNTER — TELEPHONE (OUTPATIENT)
Dept: BARIATRICS/WEIGHT MGMT | Facility: CLINIC | Age: 28
End: 2018-06-04

## 2018-06-04 ENCOUNTER — OFFICE VISIT (OUTPATIENT)
Dept: BARIATRICS/WEIGHT MGMT | Facility: CLINIC | Age: 28
End: 2018-06-04

## 2018-06-04 VITALS
TEMPERATURE: 98.9 F | SYSTOLIC BLOOD PRESSURE: 149 MMHG | BODY MASS INDEX: 49.75 KG/M2 | HEIGHT: 64 IN | DIASTOLIC BLOOD PRESSURE: 74 MMHG | WEIGHT: 291.4 LBS | HEART RATE: 84 BPM | OXYGEN SATURATION: 100 %

## 2018-06-04 DIAGNOSIS — K21.9 GASTROESOPHAGEAL REFLUX DISEASE, ESOPHAGITIS PRESENCE NOT SPECIFIED: ICD-10-CM

## 2018-06-04 PROCEDURE — 99214 OFFICE O/P EST MOD 30 MIN: CPT | Performed by: SURGERY

## 2018-06-04 NOTE — TELEPHONE ENCOUNTER
Patient came in for an office visit today.  Outside Medication Rec showed that the patient got an injection of Dexamethasone and Zofran ODT today 06-04-18 at the [facility].  Upon questioning the patient regarding the above medications she denied being at the [facility] today nor receiving any medications from their office.  Last visit to their office was maybe like Jan 2018.  Made sure that correct patient was opened as Name, Medical, Surgical and Demo information was reviewed and confirmed.   Dr Spencer notified.       Called left message for the [facility]      Called and spoke with Mandi at the [facility]   She stated that patient had not received any medications from their office since jan 2018.  Explained that Outside Med rec showed the above medications and that patient confirmed her Clinical and Demo information.  She stated that she would make a note in the patients chart at their office.     Patient notified of the above conversation with the [facility].     Oked per patient

## 2018-06-04 NOTE — PROGRESS NOTES
Patient Care Team:  Miky Biggs MD as PCP - General (General Practice)    Reason for Visit:  Surgical Weight loss    Subjective     Patient is a 27 y.o. female presents with morbid obesity and her Body mass index is 50.02 kg/m².     She is here for discussion about the esophagogastroduodenoscopy procedure.  She stated she has been with the disease of obesity for year(s).  She stated she suffers from reflux and back pain due to her weight gain.  She stated that weight loss helps alleviate these symptoms.   She stated that she has tried multiple diet regimens including participating in a medically supervised weight loss program to help with weight loss.  She stated that she has attempted these conservative methods for weight loss without maintaining long term success.        Review of Systems  General ROS: positive for  - fatigue  Respiratory ROS: no cough, shortness of breath, or wheezing  Cardiovascular ROS: no chest pain or dyspnea on exertion  Gastrointestinal ROS: no abdominal pain, change in bowel habits, or black or bloody stools  positive for - heartburn  Musculoskeletal ROS: positive for - joint pain    History  Past Medical History:   Diagnosis Date   • Anxiety    • Back pain    • Candidiasis of vulva and vagina    • Constipation    • Contraception    • Depression    • GERD (gastroesophageal reflux disease)    • Migraine    • Visit for routine gyn exam    • White coat syndrome without diagnosis of hypertension      Past Surgical History:   Procedure Laterality Date   • CHOLECYSTECTOMY      lap   • OTHER SURGICAL HISTORY      reset arm   • PAP SMEAR  07/23/2012   • WISDOM TOOTH EXTRACTION       Family History   Problem Relation Age of Onset   • Hypertension Mother    • Depression Mother    • Diabetes Other         type 2   • Hypertension Father    • Hyperlipidemia Father    • Diabetes Father    • Breast cancer Neg Hx    • Ovarian cancer Neg Hx    • Uterine cancer Neg Hx    • Colon cancer Neg Hx       Social History   Substance Use Topics   • Smoking status: Never Smoker   • Smokeless tobacco: Never Used   • Alcohol use Yes      Comment: every now and then       (Not in a hospital admission)  Allergies:  Amoxicillin and Penicillins      Current Outpatient Prescriptions:   •  ergocalciferol (ERGOCALCIFEROL) 91301 units capsule, Take 1 capsule by mouth 1 (One) Time Per Week., Disp: 4 capsule, Rfl: 2    Objective     Vital Signs  Temp:  [98.9 °F (37.2 °C)] 98.9 °F (37.2 °C)  Heart Rate:  [84] 84  BP: (149)/(74) 149/74  Body mass index is 50.02 kg/m².  1    06/04/18  1140   Weight: 132 kg (291 lb 6.4 oz)       Physical Exam:      HEENT: extra ocular movement intact  Respiratory: appears well, vitals normal, no respiratory distress, acyanotic, normal RR, chest clear, no wheezing, crepitations, rhonchi, normal symmetric air entry  Cardiovascular: Regular rate and rhythm, S1, S2 normal, no murmur, click, rub or gallop  GI: Soft, non-tender, normal bowel sounds; no bruits, organomegaly or masses.  Abnormal shape: obese  Musculoskeletal: inspection - no abnormality  Neurologic: alert, oriented, normal speech, no focal findings or movement disorder noted       Results Review:   None        Assessment/Plan   Encounter Diagnoses   Name Primary?   • Body mass index (BMI) of 50-59.9 in adult Yes   • Gastroesophageal reflux disease, esophagitis presence not specified              1.  I believe this patient will be a good candidate for weight loss surgery.  I have discussed the Alf - Y Gastric Bypass, laparoscopic sleeve gastrectomy and the Laparoscopic Gastric Band procedures.  We discussed the benefits of the surgeries including the benefit of weight loss and the possible reversal of co-morbid conditions associated with morbid obesity. I explained to the patient that prior to making a definitive decision on the type of surgery she will require an esophagogastroduodenoscopy with biopsies to assess for any contraindications  for surgical weight loss.  I explained the alternatives  include not doing anything, or pursuing an UGI series which only offers a diagnosis with potential less accuracy compared to EGD, the benefits of the EGD such as identifying the pathology and anatomy of the upper GI system, and the risks and complications of the endoscopy were discussed in detail as well. I discussed the risk of perforation (one out of 9526-8055, riskier with dilation), bleeding (one out of 500), and the rare risks of infection, adverse reaction to anesthesia, respiratory failure, cardiac failure including MI and adverse reaction to medications such as allergic reactions. We discussed consequences that could occur if a risk were to develop such as the need for hospitalization, blood transfusion, surgical intervention, medications, pain, disability and death. The patient verbalizes understanding and agrees to proceed. such as bleeding, perforation, swallowing difficulties and gas bloat can occur after this procedure.    Upon completion of our discussion and addressing and answering her questions to her satisfation, informed consent was obtained.  She will be scheduled accordingly for the esophagogastroduodenoscopy procedure.    I discussed the patients findings and my recommendations with patient and Her mother.     Dr. Ruben Spencer MD [facility]    06/04/18  12:17 PM  Patient Care Team:  Miky Biggs MD as PCP - General (General Practice)

## 2018-06-29 ENCOUNTER — RESULTS ENCOUNTER (OUTPATIENT)
Dept: BARIATRICS/WEIGHT MGMT | Facility: CLINIC | Age: 28
End: 2018-06-29

## 2018-06-29 DIAGNOSIS — E66.01 OBESITY, MORBID, BMI 50 OR HIGHER (HCC): ICD-10-CM

## 2018-07-05 ENCOUNTER — LAB (OUTPATIENT)
Dept: LAB | Facility: HOSPITAL | Age: 28
End: 2018-07-05
Attending: NURSE PRACTITIONER

## 2018-07-05 ENCOUNTER — OFFICE VISIT (OUTPATIENT)
Dept: BARIATRICS/WEIGHT MGMT | Facility: CLINIC | Age: 28
End: 2018-07-05

## 2018-07-05 VITALS
TEMPERATURE: 98.7 F | DIASTOLIC BLOOD PRESSURE: 89 MMHG | HEART RATE: 82 BPM | BODY MASS INDEX: 49.68 KG/M2 | HEIGHT: 64 IN | WEIGHT: 291 LBS | OXYGEN SATURATION: 100 % | SYSTOLIC BLOOD PRESSURE: 147 MMHG

## 2018-07-05 DIAGNOSIS — E66.01 MORBID OBESITY WITH BMI OF 45.0-49.9, ADULT (HCC): Primary | ICD-10-CM

## 2018-07-05 DIAGNOSIS — R03.0 ELEVATED BLOOD PRESSURE READING WITHOUT DIAGNOSIS OF HYPERTENSION: ICD-10-CM

## 2018-07-05 DIAGNOSIS — E55.9 VITAMIN D DEFICIENCY: ICD-10-CM

## 2018-07-05 DIAGNOSIS — E66.01 MORBID OBESITY WITH BMI OF 45.0-49.9, ADULT (HCC): ICD-10-CM

## 2018-07-05 LAB — 25(OH)D3 SERPL-MCNC: 46.6 NG/ML (ref 30–100)

## 2018-07-05 PROCEDURE — 99213 OFFICE O/P EST LOW 20 MIN: CPT | Performed by: NURSE PRACTITIONER

## 2018-07-05 PROCEDURE — 82306 VITAMIN D 25 HYDROXY: CPT | Performed by: NURSE PRACTITIONER

## 2018-07-05 PROCEDURE — 36415 COLL VENOUS BLD VENIPUNCTURE: CPT

## 2018-07-05 NOTE — PATIENT INSTRUCTIONS
Jinny Rothman has done well this month with healthy changes. Patient's weight has not changed this month.   Today we discussed healthy changes in lifestyle, diet, and exercise.   Handout provided on perfect protein and post op vitamins.  Intensive behavioral therapy for obesity was done today.   Goals for this month are: 3 meals per day with protein at each; eat protein first, use smaller plate; exercise as advised.  Keep EGD appt scheduled for 7/10/18.  Keep cardiology appt scheduled for 7/16/18.  Keep Psych appt for August 1st.   Get vitamin D level drawn today. Office will call with results.   Follow up in one month for a weight recheck.

## 2018-07-05 NOTE — PROGRESS NOTES
"Subjective   Jinny Rothman is a 27 y.o. female.     History of Present Illness   Jinny Rothman is here with morbid obesity and desires to have surgery for weight loss. This is the patient's 4th visit to the office.  Patient has been seen by [facility] and will have second appt with them August 1st. She has an EGD scheduled for July 10th. She has cardiology appt scheduled for July 16th. She has been on vitamin D replacement and is due to have that level checked today. Patient has been exercising by walking four days per week and for 30 minutes. Patient has been making health dietary choices and eating 3 meals per day with protein at each. Patient has been eating 60 grams of protein per day. Patient is drinking 64 ounces of water per day.   Vitals:    07/05/18 1601   BP: 147/89   BP Location: Right arm   Patient Position: Sitting   Cuff Size: Adult   Pulse: 82   Temp: 98.7 °F (37.1 °C)   SpO2: 100%   Weight: 132 kg (291 lb)   Height: 162.6 cm (64\")         The following portions of the patient's history were reviewed and updated as appropriate: allergies, current medications, past family history, past medical history, past social history, past surgical history and problem list.    Review of Systems   Constitutional: Positive for fatigue. Negative for activity change and appetite change.   Eyes: Negative.    Respiratory: Negative.  Negative for apnea, cough, chest tightness and shortness of breath.    Cardiovascular: Negative.  Negative for chest pain, palpitations and leg swelling.   Gastrointestinal: Positive for diarrhea. Negative for abdominal pain, anal bleeding, constipation, nausea and vomiting.        Heartburn   Endocrine: Negative.    Genitourinary: Negative.    Musculoskeletal: Positive for arthralgias, back pain and neck pain.   Skin: Negative.    Allergic/Immunologic: Negative.    Neurological: Positive for headaches. Negative for seizures and syncope.   Hematological: Negative.  Does not bruise/bleed " easily.   Psychiatric/Behavioral: Positive for dysphoric mood. Negative for self-injury and sleep disturbance.       Objective   Physical Exam   Constitutional: She is oriented to person, place, and time. Vital signs are normal. She appears well-developed and well-nourished. She is cooperative. No distress.   HENT:   Head: Normocephalic and atraumatic.   Nose: Nose normal.   Mouth/Throat: Oropharynx is clear and moist. No oropharyngeal exudate or tonsillar abscesses.   Eyes: Conjunctivae, EOM and lids are normal. Pupils are equal, round, and reactive to light. Right eye exhibits no discharge. Left eye exhibits no discharge.   Glasses noted   Neck: Trachea normal. Neck supple. No JVD present. Carotid bruit is not present. No neck rigidity. No tracheal deviation present. No thyromegaly present.   Cardiovascular: Normal rate, regular rhythm, S1 normal, S2 normal and normal heart sounds.    Pulmonary/Chest: Effort normal and breath sounds normal. No stridor. No respiratory distress. She has no wheezes. She has no rales.   Abdominal: Soft. Bowel sounds are normal. She exhibits no distension. There is no tenderness.   obese   Musculoskeletal: She exhibits no edema.        Right shoulder: She exhibits normal strength.   Lymphadenopathy:     She has no cervical adenopathy.   Neurological: She is alert and oriented to person, place, and time. She has normal strength. No cranial nerve deficit.   Skin: Skin is warm, dry and intact. No rash noted.   Psychiatric: She has a normal mood and affect. Her speech is normal and behavior is normal.   Alert and oriented x 3   Vitals reviewed.      Assessment/Plan   Jinny was seen today for follow-up, obesity, nutrition counseling and weight loss.    Diagnoses and all orders for this visit:    Morbid obesity with BMI of 45.0-49.9, adult (CMS/[facility])  -     Vitamin D 25 Hydroxy; Future    Vitamin D deficiency  -     Vitamin D 25 Hydroxy; Future    Elevated blood pressure reading without  diagnosis of hypertension          Jinny Rothman has done well this month with healthy changes. Patient's weight has not changed this month.   Today we discussed healthy changes in lifestyle, diet, and exercise.   Handout provided on perfect protein and post op vitamins.  Intensive behavioral therapy for obesity was done today.   Goals for this month are: 3 meals per day with protein at each; eat protein first, use smaller plate; exercise as advised.  Keep EGD appt scheduled for 7/10/18.  Keep cardiology appt scheduled for 7/16/18.  Keep Psych appt for August 1st.   Get vitamin D level drawn today. Office will call with results.   Follow up in one month for a weight recheck.

## 2018-07-06 ENCOUNTER — TELEPHONE (OUTPATIENT)
Dept: BARIATRICS/WEIGHT MGMT | Facility: CLINIC | Age: 28
End: 2018-07-06

## 2018-07-06 NOTE — TELEPHONE ENCOUNTER
Vitamin D level is now normal.  Patient may take vitamin D3 6703-5234 international units once a day to keep level normal.  Patient has been notified by phone and by messaging system.

## 2018-07-10 ENCOUNTER — HOSPITAL ENCOUNTER (OUTPATIENT)
Facility: HOSPITAL | Age: 28
Setting detail: HOSPITAL OUTPATIENT SURGERY
Discharge: HOME | End: 2018-07-10
Attending: SURGERY | Admitting: NURSE ANESTHETIST, CERTIFIED REGISTERED

## 2018-07-10 ENCOUNTER — ANESTHESIA (OUTPATIENT)
Dept: GASTROENTEROLOGY | Facility: HOSPITAL | Age: 28
End: 2018-07-10

## 2018-07-10 ENCOUNTER — ANESTHESIA EVENT (OUTPATIENT)
Dept: GASTROENTEROLOGY | Facility: HOSPITAL | Age: 28
End: 2018-07-10

## 2018-07-10 VITALS
HEART RATE: 68 BPM | DIASTOLIC BLOOD PRESSURE: 86 MMHG | HEIGHT: 64 IN | OXYGEN SATURATION: 99 % | SYSTOLIC BLOOD PRESSURE: 130 MMHG | RESPIRATION RATE: 2 BRPM | TEMPERATURE: 98.6 F | BODY MASS INDEX: 49.17 KG/M2 | WEIGHT: 288 LBS

## 2018-07-10 DIAGNOSIS — K21.9 GASTROESOPHAGEAL REFLUX DISEASE, ESOPHAGITIS PRESENCE NOT SPECIFIED: ICD-10-CM

## 2018-07-10 PROBLEM — K20.90 ESOPHAGITIS: Status: ACTIVE | Noted: 2018-07-10

## 2018-07-10 LAB — B-HCG UR QL: NEGATIVE

## 2018-07-10 PROCEDURE — 43239 EGD BIOPSY SINGLE/MULTIPLE: CPT | Performed by: SURGERY

## 2018-07-10 PROCEDURE — 25010000002 PROPOFOL 10 MG/ML EMULSION: Performed by: NURSE ANESTHETIST, CERTIFIED REGISTERED

## 2018-07-10 PROCEDURE — 87081 CULTURE SCREEN ONLY: CPT | Performed by: SURGERY

## 2018-07-10 PROCEDURE — 81025 URINE PREGNANCY TEST: CPT | Performed by: NURSE ANESTHETIST, CERTIFIED REGISTERED

## 2018-07-10 PROCEDURE — 88305 TISSUE EXAM BY PATHOLOGIST: CPT | Performed by: SURGERY

## 2018-07-10 RX ORDER — SODIUM CHLORIDE 9 MG/ML
500 INJECTION, SOLUTION INTRAVENOUS CONTINUOUS PRN
Status: DISCONTINUED | OUTPATIENT
Start: 2018-07-10 | End: 2018-07-10 | Stop reason: HOSPADM

## 2018-07-10 RX ORDER — PROPOFOL 10 MG/ML
VIAL (ML) INTRAVENOUS AS NEEDED
Status: DISCONTINUED | OUTPATIENT
Start: 2018-07-10 | End: 2018-07-10 | Stop reason: SURG

## 2018-07-10 RX ORDER — LIDOCAINE HYDROCHLORIDE 20 MG/ML
INJECTION, SOLUTION INFILTRATION; PERINEURAL AS NEEDED
Status: DISCONTINUED | OUTPATIENT
Start: 2018-07-10 | End: 2018-07-10 | Stop reason: SURG

## 2018-07-10 RX ORDER — PANTOPRAZOLE SODIUM 40 MG/1
40 TABLET, DELAYED RELEASE ORAL DAILY
Qty: 30 TABLET | Refills: 1 | Status: SHIPPED | OUTPATIENT
Start: 2018-07-10 | End: 2020-01-30

## 2018-07-10 RX ORDER — SODIUM CHLORIDE 0.9 % (FLUSH) 0.9 %
3 SYRINGE (ML) INJECTION AS NEEDED
Status: DISCONTINUED | OUTPATIENT
Start: 2018-07-10 | End: 2018-07-10 | Stop reason: HOSPADM

## 2018-07-10 RX ADMIN — PROPOFOL 250 MG: 10 INJECTION, EMULSION INTRAVENOUS at 08:01

## 2018-07-10 RX ADMIN — LIDOCAINE HYDROCHLORIDE 200 MG: 20 INJECTION, SOLUTION INFILTRATION; PERINEURAL at 08:01

## 2018-07-10 RX ADMIN — SODIUM CHLORIDE 500 ML: 9 INJECTION, SOLUTION INTRAVENOUS at 07:17

## 2018-07-10 RX ADMIN — LIDOCAINE HYDROCHLORIDE 0.5 ML: 10 INJECTION, SOLUTION EPIDURAL; INFILTRATION; INTRACAUDAL; PERINEURAL at 07:16

## 2018-07-10 NOTE — DISCHARGE INSTRUCTIONS
Use Protonix 40 mg daily for one month.  Return to normal activity tomorrow.  Advance diet as tolerated today.

## 2018-07-10 NOTE — ANESTHESIA PREPROCEDURE EVALUATION
Anesthesia Evaluation     Patient summary reviewed   no history of anesthetic complications:  NPO Solid Status: > 8 hours  NPO Liquid Status: < 2 hours           Airway   Mallampati: I  TM distance: >3 FB  Neck ROM: full  No difficulty expected  Dental - normal exam     Pulmonary    (-) asthma, sleep apnea, not a smoker  Cardiovascular - negative cardio ROS        Neuro/Psych  (-) seizures, TIA, CVA  GI/Hepatic/Renal/Endo    (+) morbid obesity, GERD,      Musculoskeletal     Abdominal    Substance History      OB/GYN          Other                        Anesthesia Plan    ASA 3     general   total IV anesthesia  intravenous induction   Anesthetic plan and risks discussed with patient.

## 2018-07-10 NOTE — BRIEF OP NOTE
ESOPHAGOGASTRODUODENOSCOPY WITH ANESTHESIA  Progress Note    Jinny Rothman  7/10/2018    Pre-op Diagnosis:   Body mass index (BMI) of 50-59.9 in adult [Z68.43]  Gastroesophageal reflux disease, esophagitis presence not specified [K21.9]       Post-Op Diagnosis Codes:     * Body mass index (BMI) of 50-59.9 in adult (CMS/[facility]) [Z68.43]     * Gastroesophageal reflux disease, esophagitis presence not specified [K21.9]     * Esophagitis determined by endoscopy [K20.9]    Procedure/CPT® Codes:      Procedure(s):  ESOPHAGOGASTRODUODENOSCOPY WITH ANESTHESIA    Surgeon(s):  Ruben Spencer MD    Anesthesia: Monitor Anesthesia Care    Staff:   Endo Technician: Linda Curiel  Endo Nurse: Kim Morales RN    Estimated Blood Loss: minimal    Urine Voided: * No values recorded between 7/10/2018  7:56 AM and 7/10/2018  8:06 AM *    Specimens:                ID Type Source Tests Collected by Time   1 :  Tissue Stomach UREASE FOR H PYLORI, 24 HR Ruben Spencer MD 7/10/2018 0801   A : bx ge junction Tissue Esophagus TISSUE PATHOLOGY EXAM Ruben Spencer MD 7/10/2018 0801     Findings: esophagitis    Complications: none      Ruben Spencer MD     Date: 7/10/2018  Time: 8:06 AM

## 2018-07-10 NOTE — H&P (VIEW-ONLY)
"Subjective   Jinny Rothman is a 27 y.o. female.     History of Present Illness   Jinny Rothman is here with morbid obesity and desires to have surgery for weight loss. This is the patient's 4th visit to the office.  Patient has been seen by [facility] and will have second appt with them August 1st. She has an EGD scheduled for July 10th. She has cardiology appt scheduled for July 16th. She has been on vitamin D replacement and is due to have that level checked today. Patient has been exercising by walking four days per week and for 30 minutes. Patient has been making health dietary choices and eating 3 meals per day with protein at each. Patient has been eating 60 grams of protein per day. Patient is drinking 64 ounces of water per day.   Vitals:    07/05/18 1601   BP: 147/89   BP Location: Right arm   Patient Position: Sitting   Cuff Size: Adult   Pulse: 82   Temp: 98.7 °F (37.1 °C)   SpO2: 100%   Weight: 132 kg (291 lb)   Height: 162.6 cm (64\")         The following portions of the patient's history were reviewed and updated as appropriate: allergies, current medications, past family history, past medical history, past social history, past surgical history and problem list.    Review of Systems   Constitutional: Positive for fatigue. Negative for activity change and appetite change.   Eyes: Negative.    Respiratory: Negative.  Negative for apnea, cough, chest tightness and shortness of breath.    Cardiovascular: Negative.  Negative for chest pain, palpitations and leg swelling.   Gastrointestinal: Positive for diarrhea. Negative for abdominal pain, anal bleeding, constipation, nausea and vomiting.        Heartburn   Endocrine: Negative.    Genitourinary: Negative.    Musculoskeletal: Positive for arthralgias, back pain and neck pain.   Skin: Negative.    Allergic/Immunologic: Negative.    Neurological: Positive for headaches. Negative for seizures and syncope.   Hematological: Negative.  Does not bruise/bleed " 68 year old man with history of CAD s/p CABG in 2002 (LIMA-D1-LAD, SVG-OM1, SVG-OM2, left radial from aorta to RCA) with subsequent cath in 2010 showing occluded vein grafts and patent LIMA-D1-LAD, left radial-RCA, HTN, HLD, CKD 3 who is referred for cardiac catheterization after stress echo suggested anterior wall ischemia.    FINDINGS:  1. Stable coronary artery disease:  - Left main: Distal 50% stenosis  - LAD: Moderate diffuse disease. LAD is angiographically normal distal to the LIMA-D1-LAD graft.  - LCx: Occluded after proximal segment. Similar to prior cath in 2010.  - RCA: Not engaged. Known to be occluded.  - LIMA-D1-LAD: Widely patent.  - Left radial-distal RCA: Widely patent.  - SVG-OM1: Known to be occluded. Not injected.  - SVG-OM2: Known to be occluded. Not injected.    2. Successful Perclose of right femoral artery.    PLAN:  1. Bedrest per protocol.  2. Increase metoprolol XL 50 mg to twice a day (previously once a day).  3. Follow-up with Dr. Butler. Will perform treadmill stress test as outpatient a few weeks after beta-blocker uptitration.    The above assessment and plan was formulated by Dr. Butler, who was present during the entire procedure.       easily.   Psychiatric/Behavioral: Positive for dysphoric mood. Negative for self-injury and sleep disturbance.       Objective   Physical Exam   Constitutional: She is oriented to person, place, and time. Vital signs are normal. She appears well-developed and well-nourished. She is cooperative. No distress.   HENT:   Head: Normocephalic and atraumatic.   Nose: Nose normal.   Mouth/Throat: Oropharynx is clear and moist. No oropharyngeal exudate or tonsillar abscesses.   Eyes: Conjunctivae, EOM and lids are normal. Pupils are equal, round, and reactive to light. Right eye exhibits no discharge. Left eye exhibits no discharge.   Glasses noted   Neck: Trachea normal. Neck supple. No JVD present. Carotid bruit is not present. No neck rigidity. No tracheal deviation present. No thyromegaly present.   Cardiovascular: Normal rate, regular rhythm, S1 normal, S2 normal and normal heart sounds.    Pulmonary/Chest: Effort normal and breath sounds normal. No stridor. No respiratory distress. She has no wheezes. She has no rales.   Abdominal: Soft. Bowel sounds are normal. She exhibits no distension. There is no tenderness.   obese   Musculoskeletal: She exhibits no edema.        Right shoulder: She exhibits normal strength.   Lymphadenopathy:     She has no cervical adenopathy.   Neurological: She is alert and oriented to person, place, and time. She has normal strength. No cranial nerve deficit.   Skin: Skin is warm, dry and intact. No rash noted.   Psychiatric: She has a normal mood and affect. Her speech is normal and behavior is normal.   Alert and oriented x 3   Vitals reviewed.      Assessment/Plan   Jinny was seen today for follow-up, obesity, nutrition counseling and weight loss.    Diagnoses and all orders for this visit:    Morbid obesity with BMI of 45.0-49.9, adult (CMS/[facility])  -     Vitamin D 25 Hydroxy; Future    Vitamin D deficiency  -     Vitamin D 25 Hydroxy; Future    Elevated blood pressure reading without  diagnosis of hypertension          Jinyn Rothman has done well this month with healthy changes. Patient's weight has not changed this month.   Today we discussed healthy changes in lifestyle, diet, and exercise.   Handout provided on perfect protein and post op vitamins.  Intensive behavioral therapy for obesity was done today.   Goals for this month are: 3 meals per day with protein at each; eat protein first, use smaller plate; exercise as advised.  Keep EGD appt scheduled for 7/10/18.  Keep cardiology appt scheduled for 7/16/18.  Keep Psych appt for August 1st.   Get vitamin D level drawn today. Office will call with results.   Follow up in one month for a weight recheck.

## 2018-07-10 NOTE — ANESTHESIA POSTPROCEDURE EVALUATION
Patient: Jinny Rothman    Procedure Summary     Date:  07/10/18 Room / Location:  [facility] ENDOSCOPY 2 /  [facility]    Anesthesia Start:  0757 Anesthesia Stop:  0808    Procedure:  ESOPHAGOGASTRODUODENOSCOPY WITH ANESTHESIA (N/A Esophagus) Diagnosis:       Body mass index (BMI) of 50-59.9 in adult (CMS/HCC)      Gastroesophageal reflux disease, esophagitis presence not specified      Esophagitis determined by endoscopy      (Body mass index (BMI) of 50-59.9 in adult [Z68.43])      (Gastroesophageal reflux disease, esophagitis presence not specified [K21.9])    Surgeon:  Ruben Spencer MD Provider:  Pravin Marks CRNA    Anesthesia Type:  general ASA Status:  3          Anesthesia Type: general  Last vitals  BP   130/86 (07/10/18 0830)   Temp   98.6 °F (37 °C) (07/10/18 0658)   Pulse   68 (07/10/18 0830)   Resp   (!) 2 (07/10/18 0830)     SpO2   99 % (07/10/18 0830)     Post Anesthesia Care and Evaluation    Patient location during evaluation: PHASE II  Patient participation: complete - patient participated  Level of consciousness: awake  Pain management: adequate  Airway patency: patent  Anesthetic complications: No anesthetic complications  Respiratory status: acceptable  Hydration status: acceptable

## 2018-07-10 NOTE — ANESTHESIA POSTPROCEDURE EVALUATION
Patient: Jinny Rothman    Procedure Summary     Date:  07/10/18 Room / Location:   [facility] /  [facility]    Anesthesia Start:  0757 Anesthesia Stop:  0808    Procedure:  ESOPHAGOGASTRODUODENOSCOPY WITH ANESTHESIA (N/A Esophagus) Diagnosis:       Body mass index (BMI) of 50-59.9 in adult (CMS/HCC)      Gastroesophageal reflux disease, esophagitis presence not specified      Esophagitis determined by endoscopy      (Body mass index (BMI) of 50-59.9 in adult [Z68.43])      (Gastroesophageal reflux disease, esophagitis presence not specified [K21.9])    Surgeon:  Ruben Spencer MD Provider:  Pravin Marks CRNA    Anesthesia Type:  general ASA Status:  3          Anesthesia Type: general  Last vitals  BP   130/86 (07/10/18 0830)   Temp   98.6 °F (37 °C) (07/10/18 0658)   Pulse   68 (07/10/18 0830)   Resp   (!) 2 (07/10/18 0830)     SpO2   99 % (07/10/18 0830)     Anesthesia Post Evaluation

## 2018-07-11 LAB — UREASE TISS QL: NEGATIVE

## 2018-07-16 ENCOUNTER — OFFICE VISIT (OUTPATIENT)
Dept: CARDIOLOGY | Facility: CLINIC | Age: 28
End: 2018-07-16

## 2018-07-16 VITALS
SYSTOLIC BLOOD PRESSURE: 118 MMHG | HEIGHT: 64 IN | BODY MASS INDEX: 49.17 KG/M2 | HEART RATE: 91 BPM | OXYGEN SATURATION: 98 % | DIASTOLIC BLOOD PRESSURE: 80 MMHG | WEIGHT: 288 LBS

## 2018-07-16 DIAGNOSIS — E66.01 MORBID OBESITY (HCC): Primary | ICD-10-CM

## 2018-07-16 DIAGNOSIS — Z01.818 PREOPERATIVE EVALUATION TO RULE OUT SURGICAL CONTRAINDICATION: ICD-10-CM

## 2018-07-16 PROCEDURE — 99203 OFFICE O/P NEW LOW 30 MIN: CPT | Performed by: INTERNAL MEDICINE

## 2018-07-16 PROCEDURE — 93000 ELECTROCARDIOGRAM COMPLETE: CPT | Performed by: INTERNAL MEDICINE

## 2018-07-16 RX ORDER — IBUPROFEN 200 MG
200 TABLET ORAL EVERY 6 HOURS PRN
COMMUNITY
End: 2020-01-30

## 2018-07-16 NOTE — PROGRESS NOTES
Reason for Visit: Preoperative evaluation.    HPI:  Jinny Rothman is a 27 y.o. female is being seen for consultation today at the request of Dr Spencer.  She is following in the gastric surgical weight loss clinic.  She is planning on having the gastric sleeve surgery in the near future.  She denies any cardiac history.  She has no cardiac symptoms and specifically denies any chest pain, palpitations, dizziness, syncope, PND, or orthopnea.  Historically she has not been very active but is working on this.  She is now walking for 15 minutes 3 days a week and not having any issues with this.  She reports losing about 20 pounds since being in the weight loss program.    Previous Cardiac Testing and Procedures:  - none    Patient Active Problem List   Diagnosis   • Body mass index (BMI) of 50-59.9 in adult (CMS/[facility])   • Gastroesophageal reflux disease   • Esophagitis       Social History   Substance Use Topics   • Smoking status: Never Smoker   • Smokeless tobacco: Never Used   • Alcohol use Yes      Comment: every now and then       Family History   Problem Relation Age of Onset   • Hypertension Mother    • Depression Mother    • Diabetes Other         type 2   • Hypertension Father    • Hyperlipidemia Father    • Diabetes Father    • Breast cancer Neg Hx    • Ovarian cancer Neg Hx    • Uterine cancer Neg Hx    • Colon cancer Neg Hx        The following portions of the patient's history were reviewed and updated as appropriate: allergies, current medications, past family history, past medical history, past social history, past surgical history and problem list.      Current Outpatient Prescriptions:   •  ergocalciferol (ERGOCALCIFEROL) 70459 units capsule, Take 1 capsule by mouth 1 (One) Time Per Week., Disp: 4 capsule, Rfl: 2  •  etonogestrel-ethinyl estradiol (NUVARING) 0.12-0.015 MG/24HR vaginal ring, Insert 1 each into the vagina Every 28 (Twenty-Eight) Days. Insert vaginally and leave in place for 3 consecutive  "weeks, then remove for 1 week., Disp: , Rfl:   •  ibuprofen (ADVIL,MOTRIN) 200 MG tablet, Take 200 mg by mouth Every 6 (Six) Hours As Needed for Mild Pain ., Disp: , Rfl:   •  pantoprazole (PROTONIX) 40 MG EC tablet, Take 1 tablet by mouth Daily., Disp: 30 tablet, Rfl: 1    Review of Systems   Constitution: Negative for chills, fever and weight loss.   HENT: Negative for sore throat.    Eyes: Negative for blurred vision and visual disturbance.   Cardiovascular: Negative for chest pain, dyspnea on exertion, leg swelling, palpitations, paroxysmal nocturnal dyspnea and syncope.   Respiratory: Negative for cough and shortness of breath.    Endocrine: Negative for cold intolerance and polyuria.   Skin: Negative for itching and rash.   Musculoskeletal: Negative for joint swelling and myalgias.   Gastrointestinal: Negative for abdominal pain, diarrhea and vomiting.   Genitourinary: Negative for dysuria and hematuria.   Neurological: Negative for dizziness, headaches and numbness.   Psychiatric/Behavioral: Negative for depression. The patient is not nervous/anxious.    Allergic/Immunologic: Negative for hives.       Objective   /80 (BP Location: Left arm, Patient Position: Sitting, Cuff Size: Large Adult)   Pulse 91   Ht 162.6 cm (64.02\")   Wt 131 kg (288 lb)   SpO2 98%   BMI 49.41 kg/m²   Physical Exam   Constitutional: She is oriented to person, place, and time. She appears well-developed and well-nourished.   HENT:   Head: Normocephalic and atraumatic.   Eyes: Conjunctivae and EOM are normal. Pupils are equal, round, and reactive to light.   Neck: Normal range of motion. Neck supple. No JVD present. No thyromegaly present.   Cardiovascular: Normal rate, regular rhythm and normal heart sounds.    No murmur heard.  Pulmonary/Chest: Effort normal and breath sounds normal. She has no wheezes. She has no rales.   Abdominal: Soft. Bowel sounds are normal. She exhibits distension (Obese). There is no tenderness. "   Musculoskeletal: Normal range of motion. She exhibits no edema.   Neurological: She is alert and oriented to person, place, and time. Coordination normal.   Skin: Skin is warm and dry. No rash noted.   Psychiatric: She has a normal mood and affect. Her behavior is normal.       ECG 12 Lead  Date/Time: 7/16/2018 2:36 PM  Performed by: ABBEY VORA  Authorized by: ABBEY VORA   Previous ECG: no previous ECG available  Rhythm: sinus rhythm  Rate: normal  Clinical impression: normal ECG              ICD-10-CM ICD-9-CM   1. Preoperative evaluation to rule out surgical contraindication Z01.818 V72.83   2. Morbid obesity (CMS/[facility]) E66.01 278.01         Assessment/Plan:  1. Morbid obesity: BMI 49.4.  Follows in the bariatric weight loss surgical program and planning on having gastric sleeve in the near future.  Continue to  on lifestyle modification and weight loss.    2.  Preoperative evaluation: Patient is low risk for surgery from a cardiac standpoint.  Functional status is greater than 4 METs and has no cardiac symptoms.  No further testing is indicated prior to surgery.

## 2018-07-17 LAB
CYTO UR: NORMAL
LAB AP CASE REPORT: NORMAL
PATH REPORT.FINAL DX SPEC: NORMAL
PATH REPORT.GROSS SPEC: NORMAL

## 2018-07-27 ENCOUNTER — RESULTS ENCOUNTER (OUTPATIENT)
Dept: BARIATRICS/WEIGHT MGMT | Facility: CLINIC | Age: 28
End: 2018-07-27

## 2018-07-27 DIAGNOSIS — E66.01 OBESITY, MORBID, BMI 50 OR HIGHER (HCC): ICD-10-CM

## 2018-08-01 ENCOUNTER — OFFICE VISIT (OUTPATIENT)
Dept: PSYCHIATRY | Age: 28
End: 2018-08-01
Payer: COMMERCIAL

## 2018-08-01 VITALS
BODY MASS INDEX: 49.72 KG/M2 | HEART RATE: 92 BPM | DIASTOLIC BLOOD PRESSURE: 80 MMHG | WEIGHT: 291.2 LBS | OXYGEN SATURATION: 100 % | SYSTOLIC BLOOD PRESSURE: 144 MMHG | HEIGHT: 64 IN

## 2018-08-01 DIAGNOSIS — F43.20 ADJUSTMENT DISORDER, UNSPECIFIED TYPE: Primary | ICD-10-CM

## 2018-08-01 PROCEDURE — 99205 OFFICE O/P NEW HI 60 MIN: CPT | Performed by: NURSE PRACTITIONER

## 2018-08-01 ASSESSMENT — ENCOUNTER SYMPTOMS
ALLERGIC/IMMUNOLOGIC NEGATIVE: 1
EYES NEGATIVE: 1
RESPIRATORY NEGATIVE: 1
GASTROINTESTINAL NEGATIVE: 1
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
## 2018-08-01 NOTE — LETTER
[address] [facility]  Zachary Mathur  [phone] Option 3      8/1/2018      Nita Harris  1990      To Whom It May Concern,    Jewel Phoenix has been evaluated by our office for potential bariatric surgery. Our clinical impression is that the above patient is currently psychologically stable and motivated to participate in a bariatric surgical procedure. This patient is aware of the time frame involved in the healing and convalescent period and has an adequate support system available. Psychiatric issues including depression and psychosis are not present at this time. Therefore, Jewel Phoenix appears to be a candidate for the bariatric procedure. If we can be of further assistance in this patients care, please contact our office. Sincerely,      CONCEPCION Davis, [facility] TAMIKA  Reuben Shelby.  Jaimee Em Ed.D.D.O.DFAPA, Medical Director  Board Certified Psychiatrist

## 2018-08-13 ENCOUNTER — OFFICE VISIT (OUTPATIENT)
Dept: BARIATRICS/WEIGHT MGMT | Facility: CLINIC | Age: 28
End: 2018-08-13

## 2018-08-13 VITALS
HEIGHT: 64 IN | OXYGEN SATURATION: 100 % | HEART RATE: 82 BPM | DIASTOLIC BLOOD PRESSURE: 90 MMHG | SYSTOLIC BLOOD PRESSURE: 148 MMHG | BODY MASS INDEX: 49.03 KG/M2 | WEIGHT: 287.2 LBS | TEMPERATURE: 99.6 F

## 2018-08-13 DIAGNOSIS — Z86.39 HISTORY OF VITAMIN D DEFICIENCY: ICD-10-CM

## 2018-08-13 DIAGNOSIS — E66.01 OBESITY, CLASS III, BMI 40-49.9 (MORBID OBESITY) (HCC): Primary | ICD-10-CM

## 2018-08-13 PROCEDURE — 99212 OFFICE O/P EST SF 10 MIN: CPT | Performed by: NURSE PRACTITIONER

## 2018-08-13 RX ORDER — MELATONIN
2000 DAILY
COMMUNITY
End: 2020-01-30

## 2018-08-13 NOTE — PROGRESS NOTES
"Subjective   Jinny Rothman is a 28 y.o. female.     History of Present Illness   Jinny Rothman is here with morbid obesity and desires to have surgery for weight loss. This is the patient's 5th visit to the office.  Patient has been cleared by psychologist and cardiologist. She has had EGD done. Her vitamin D level is now normal and she is taking OTC supplement.  Patient has been exercising by walking 4 times per week for 30 minutes. Patient has been making health dietary choices and eating 3  meals per day with protein at each. Patient has been eating 60 grams of protein per day. Patient is drinking 64 ounces of water per day.   Vitals:    08/13/18 1549   BP: 148/90   BP Location: Right arm   Patient Position: Sitting   Cuff Size: Adult   Pulse: 82   Temp: 99.6 °F (37.6 °C)   SpO2: 100%   Weight: 130 kg (287 lb 3.2 oz)   Height: 162.6 cm (64\")         The following portions of the patient's history were reviewed and updated as appropriate: allergies, current medications, past family history, past medical history, past social history, past surgical history and problem list.    Review of Systems   Constitutional: Negative for activity change, appetite change and fatigue.   Eyes: Negative.    Respiratory: Negative.  Negative for apnea, cough, chest tightness and shortness of breath.    Cardiovascular: Negative.  Negative for chest pain, palpitations and leg swelling.   Gastrointestinal: Negative.  Negative for abdominal pain, anal bleeding, constipation, nausea and vomiting.   Endocrine: Negative.    Genitourinary: Negative.    Musculoskeletal: Positive for arthralgias, back pain and neck pain.   Skin: Negative.    Allergic/Immunologic: Negative.    Neurological: Positive for headaches. Negative for seizures and syncope.   Hematological: Bruises/bleeds easily.   Psychiatric/Behavioral: Negative.  Negative for dysphoric mood, self-injury and sleep disturbance.       Objective   Physical Exam   Constitutional: She is " oriented to person, place, and time. Vital signs are normal. She appears well-developed and well-nourished. She is cooperative. No distress.   HENT:   Head: Normocephalic and atraumatic.   Nose: Nose normal.   Mouth/Throat: Oropharynx is clear and moist. No oropharyngeal exudate or tonsillar abscesses.   Eyes: Pupils are equal, round, and reactive to light. Conjunctivae, EOM and lids are normal. Right eye exhibits no discharge. Left eye exhibits no discharge.   Neck: Trachea normal. Neck supple. No JVD present. Carotid bruit is not present. No neck rigidity. No tracheal deviation present. No thyromegaly present.   Cardiovascular: Normal rate, regular rhythm, S1 normal, S2 normal and normal heart sounds.    Pulmonary/Chest: Effort normal and breath sounds normal. No stridor. No respiratory distress. She has no wheezes. She has no rales.   Abdominal: Soft. Bowel sounds are normal. She exhibits no distension. There is no tenderness.   obese   Musculoskeletal: She exhibits no edema.        Right shoulder: She exhibits normal strength.   Lymphadenopathy:     She has no cervical adenopathy.   Neurological: She is alert and oriented to person, place, and time. She has normal strength. No cranial nerve deficit.   Skin: Skin is warm, dry and intact. No rash noted.   Psychiatric: She has a normal mood and affect. Her speech is normal and behavior is normal.   Alert and oriented x 3   Vitals reviewed.      Assessment/Plan   Jinny was seen today for follow-up, obesity, nutrition counseling and weight loss.    Diagnoses and all orders for this visit:    Obesity, Class III, BMI 40-49.9 (morbid obesity) (CMS/[facility])  Comments:  working toward bariatric surgery    History of vitamin D deficiency  Comments:  continue to take OTC supplementation to keep level normal.           Jinny Rothman has done well this month with healthy changes. Patient has lost 4 pounds.   Today we discussed healthy changes in lifestyle, diet, and exercise.    Handout provided on weight proofing your home.   Intensive behavioral therapy for obesity was done today.   Goals for this month are: 3 meals per day with protein at each; eat protein first, use smaller plate; exercise as advised.  Follow up in one month for a weight recheck.

## 2018-08-13 NOTE — PATIENT INSTRUCTIONS
Jinny Rothman has done well this month with healthy changes. Patient has lost 4 pounds.   Today we discussed healthy changes in lifestyle, diet, and exercise.   Handout provided on weight proofing your home.   Intensive behavioral therapy for obesity was done today.   Goals for this month are: 3 meals per day with protein at each; eat protein first, use smaller plate; exercise as advised.  Follow up in one month for a weight recheck.

## 2018-08-24 ENCOUNTER — RESULTS ENCOUNTER (OUTPATIENT)
Dept: BARIATRICS/WEIGHT MGMT | Facility: CLINIC | Age: 28
End: 2018-08-24

## 2018-08-24 DIAGNOSIS — E66.01 OBESITY, MORBID, BMI 50 OR HIGHER (HCC): ICD-10-CM

## 2018-09-12 ENCOUNTER — OFFICE VISIT (OUTPATIENT)
Dept: BARIATRICS/WEIGHT MGMT | Facility: CLINIC | Age: 28
End: 2018-09-12

## 2018-09-12 VITALS
HEIGHT: 64 IN | HEART RATE: 88 BPM | OXYGEN SATURATION: 100 % | SYSTOLIC BLOOD PRESSURE: 149 MMHG | WEIGHT: 284 LBS | TEMPERATURE: 97.7 F | DIASTOLIC BLOOD PRESSURE: 84 MMHG | BODY MASS INDEX: 48.49 KG/M2

## 2018-09-12 DIAGNOSIS — E66.01 OBESITY, CLASS III, BMI 40-49.9 (MORBID OBESITY) (HCC): Primary | ICD-10-CM

## 2018-09-12 PROCEDURE — 99212 OFFICE O/P EST SF 10 MIN: CPT | Performed by: NURSE PRACTITIONER

## 2018-09-12 RX ORDER — ETONOGESTREL/ETHINYL ESTRADIOL .12-.015MG
RING, VAGINAL VAGINAL
Refills: 0 | OUTPATIENT
Start: 2018-09-12

## 2018-09-12 NOTE — PATIENT INSTRUCTIONS
Jinny Rothman has done well this month with healthy changes. Patient has lost 3 pounds.   Today we discussed healthy changes in lifestyle, diet, and exercise.   Handout provided on mindful eating.   Start  eating and drinking by 30 minutes.   Intensive behavioral therapy for obesity was done today.   Goals for this month are: 3 meals per day with protein at each; eat protein first, use smaller plate; exercise as advised.  Get tentative surgery date today.   Follow up in one month with Dr. Spencer to discuss surgery submission.

## 2018-09-12 NOTE — PROGRESS NOTES
"Subjective   Jinny Rothman is a 28 y.o. female.     History of Present Illness   Jinny Rothman is here with morbid obesity and desires to have surgery for weight loss. This is the patient's 6th visit to the office.  Patient has been cleared by psychologist and cardiologist. She has had EGD done. She is now on OTC vitamin D now.  Patient has been exercising by walking 30 minutes and three times per week. Patient has been making health dietary choices and eating 3  meals per day with protein at each. Patient has been eating 60 grams of protein per day. Patient is drinking 64 ounces of water per day. She is not drinking soda.   Vitals:    09/12/18 1549   BP: 149/84   BP Location: Left arm   Patient Position: Sitting   Cuff Size: Adult   Pulse: 88   Temp: 97.7 °F (36.5 °C)   SpO2: 100%   Weight: 129 kg (284 lb)   Height: 162.6 cm (64\")         The following portions of the patient's history were reviewed and updated as appropriate: allergies, current medications, past family history, past medical history, past social history, past surgical history and problem list.    Review of Systems   Constitutional: Negative for activity change, appetite change and fatigue.   Eyes: Negative.    Respiratory: Negative.  Negative for apnea, cough, chest tightness and shortness of breath.    Cardiovascular: Negative.  Negative for chest pain, palpitations and leg swelling.   Gastrointestinal: Positive for diarrhea, nausea and vomiting. Negative for abdominal pain, anal bleeding and constipation.        Has had GI bug and bronchitis   Endocrine: Negative.    Genitourinary: Negative.    Musculoskeletal: Positive for arthralgias, back pain and neck pain.   Skin: Negative.    Allergic/Immunologic: Negative.    Neurological: Positive for headaches. Negative for seizures and syncope.   Hematological: Bruises/bleeds easily.   Psychiatric/Behavioral: Positive for sleep disturbance. Negative for dysphoric mood and self-injury.       Objective "   Physical Exam   Constitutional: She is oriented to person, place, and time. Vital signs are normal. She appears well-developed and well-nourished. She is cooperative. No distress.   HENT:   Head: Normocephalic and atraumatic.   Nose: Nose normal.   Mouth/Throat: Oropharynx is clear and moist. No oropharyngeal exudate or tonsillar abscesses.   Eyes: Pupils are equal, round, and reactive to light. Conjunctivae, EOM and lids are normal. Right eye exhibits no discharge. Left eye exhibits no discharge.   Glasses noted   Neck: Trachea normal. Neck supple. No JVD present. Carotid bruit is not present. No neck rigidity. No tracheal deviation present. No thyromegaly present.   Cardiovascular: Normal rate, regular rhythm, S1 normal, S2 normal and normal heart sounds.    Pulmonary/Chest: Effort normal and breath sounds normal. No stridor. No respiratory distress. She has no wheezes. She has no rales.   Abdominal: Soft. Bowel sounds are normal. She exhibits no distension. There is no tenderness.   obese   Musculoskeletal: She exhibits no edema.        Right shoulder: She exhibits normal strength.   Lymphadenopathy:     She has no cervical adenopathy.   Neurological: She is alert and oriented to person, place, and time. She has normal strength. No cranial nerve deficit.   Skin: Skin is warm, dry and intact. No rash noted.   Psychiatric: She has a normal mood and affect. Her speech is normal and behavior is normal.   Alert and oriented x 3   Vitals reviewed.      Assessment/Plan   Jinny was seen today for follow-up, obesity, nutrition counseling and weight loss.    Diagnoses and all orders for this visit:    Obesity, Class III, BMI 40-49.9 (morbid obesity) (CMS/HCC)          Jinny Rothman has done well this month with healthy changes. Patient has lost 3 pounds.   Today we discussed healthy changes in lifestyle, diet, and exercise.   Handout provided on mindful eating.   Start  eating and drinking by 30 minutes.    Intensive behavioral therapy for obesity was done today.   Goals for this month are: 3 meals per day with protein at each; eat protein first, use smaller plate; exercise as advised.  Get tentative surgery date today.   Follow up in one month with Dr. Spencer to discuss surgery submission.

## 2018-09-13 ENCOUNTER — TELEPHONE (OUTPATIENT)
Dept: BARIATRICS/WEIGHT MGMT | Facility: CLINIC | Age: 28
End: 2018-09-13

## 2018-09-13 NOTE — TELEPHONE ENCOUNTER
Patient called stating that her insurance will not cover BA surgery even though the patient has completed 6 months. She also said that her insurance company told her that [facility] is not in her network.

## 2018-09-13 NOTE — TELEPHONE ENCOUNTER
Left message with patient regarding insurance. Dr. Spencer is in her network but surgery is not covered. I told her that we could continue to see her for [facility] and I moved her appointment over to [place] for the same day (10/04/2018). Told her to call back to cancel/reschedule or if she had any questions.

## 2018-09-17 RX ORDER — ETONOGESTREL/ETHINYL ESTRADIOL .12-.015MG
RING, VAGINAL VAGINAL
Qty: 2 EACH | Refills: 0 | Status: SHIPPED | OUTPATIENT
Start: 2018-09-17 | End: 2018-10-16 | Stop reason: SDUPTHER

## 2018-09-21 ENCOUNTER — RESULTS ENCOUNTER (OUTPATIENT)
Dept: BARIATRICS/WEIGHT MGMT | Facility: CLINIC | Age: 28
End: 2018-09-21

## 2018-09-21 DIAGNOSIS — E66.01 OBESITY, MORBID, BMI 50 OR HIGHER (HCC): ICD-10-CM

## 2018-10-16 RX ORDER — ETONOGESTREL AND ETHINYL ESTRADIOL 11.7; 2.7 MG/1; MG/1
INSERT, EXTENDED RELEASE VAGINAL
Qty: 3 EACH | Refills: 0 | Status: SHIPPED | OUTPATIENT
Start: 2018-10-16 | End: 2020-01-30

## 2018-10-19 ENCOUNTER — RESULTS ENCOUNTER (OUTPATIENT)
Dept: BARIATRICS/WEIGHT MGMT | Facility: CLINIC | Age: 28
End: 2018-10-19

## 2018-10-19 DIAGNOSIS — E66.01 OBESITY, MORBID, BMI 50 OR HIGHER (HCC): ICD-10-CM

## 2018-11-16 ENCOUNTER — RESULTS ENCOUNTER (OUTPATIENT)
Dept: BARIATRICS/WEIGHT MGMT | Facility: CLINIC | Age: 28
End: 2018-11-16

## 2018-11-16 DIAGNOSIS — E66.01 OBESITY, MORBID, BMI 50 OR HIGHER (HCC): ICD-10-CM

## 2018-12-14 ENCOUNTER — RESULTS ENCOUNTER (OUTPATIENT)
Dept: BARIATRICS/WEIGHT MGMT | Facility: CLINIC | Age: 28
End: 2018-12-14

## 2018-12-14 DIAGNOSIS — E66.01 OBESITY, MORBID, BMI 50 OR HIGHER (HCC): ICD-10-CM

## 2019-01-11 ENCOUNTER — RESULTS ENCOUNTER (OUTPATIENT)
Dept: BARIATRICS/WEIGHT MGMT | Facility: CLINIC | Age: 29
End: 2019-01-11

## 2019-01-11 DIAGNOSIS — E66.01 OBESITY, MORBID, BMI 50 OR HIGHER (HCC): ICD-10-CM

## 2019-02-08 ENCOUNTER — RESULTS ENCOUNTER (OUTPATIENT)
Dept: BARIATRICS/WEIGHT MGMT | Facility: CLINIC | Age: 29
End: 2019-02-08

## 2019-02-08 DIAGNOSIS — E66.01 OBESITY, MORBID, BMI 50 OR HIGHER (HCC): ICD-10-CM

## 2019-03-08 ENCOUNTER — RESULTS ENCOUNTER (OUTPATIENT)
Dept: BARIATRICS/WEIGHT MGMT | Facility: CLINIC | Age: 29
End: 2019-03-08

## 2019-03-08 DIAGNOSIS — E66.01 OBESITY, MORBID, BMI 50 OR HIGHER (HCC): ICD-10-CM

## 2020-01-30 ENCOUNTER — OFFICE VISIT (OUTPATIENT)
Dept: GASTROENTEROLOGY | Facility: CLINIC | Age: 30
End: 2020-01-30

## 2020-01-30 VITALS
OXYGEN SATURATION: 99 % | SYSTOLIC BLOOD PRESSURE: 126 MMHG | HEIGHT: 65 IN | HEART RATE: 99 BPM | TEMPERATURE: 98.1 F | WEIGHT: 274 LBS | DIASTOLIC BLOOD PRESSURE: 82 MMHG | BODY MASS INDEX: 45.65 KG/M2

## 2020-01-30 DIAGNOSIS — Z83.71 FAMILY HISTORY OF POLYPS IN THE COLON: ICD-10-CM

## 2020-01-30 DIAGNOSIS — K62.89 ANAL OR RECTAL PAIN: ICD-10-CM

## 2020-01-30 DIAGNOSIS — K62.5 RECTAL BLEEDING: Primary | ICD-10-CM

## 2020-01-30 PROCEDURE — 99214 OFFICE O/P EST MOD 30 MIN: CPT | Performed by: NURSE PRACTITIONER

## 2020-01-30 RX ORDER — LEVONORGESTREL AND ETHINYL ESTRADIOL 0.1-0.02MG
1 KIT ORAL DAILY
COMMUNITY
End: 2020-07-02 | Stop reason: ALTCHOICE

## 2020-01-30 RX ORDER — SODIUM, POTASSIUM,MAG SULFATES 17.5-3.13G
1 SOLUTION, RECONSTITUTED, ORAL ORAL EVERY 12 HOURS
Qty: 2 BOTTLE | Refills: 0 | Status: SHIPPED | OUTPATIENT
Start: 2020-01-30 | End: 2020-07-02

## 2020-01-30 NOTE — PROGRESS NOTES
Chief Complaint:   Chief Complaint   Patient presents with   • Rectal Bleeding     Pt c/o BRBPR for the last month-states she doesn't see any if her stools are loose   • Nausea     Pt also states she gets very nauseated before a BM    • Rectal Pain     Pt states she also has rectal pain with a BM          Patient ID: Jinny Rothman is a 29 y.o. female     History of Present Illness: This is a very pleasant 29-year-old female who is here today with complaints of rectal bleeding, nausea and rectal pain.    The patient states that she has been seen blood with bowel movements.  States that she has not seen this in her stools however she does see it with wiping.  She states that her stools have become very loose.  The patient states she also has rectal pain and some associated nausea with bowel movements.The patient was referred by BOB Sandoval with complaints of rectal bleeding.     The patient's last EGD was performed on 7/10/2018 with findings of LA grade B esophagitis per Dr. Spencer.  Patient has not had a colonoscopy in the past.  Family history: The patient states that her mother had colon polyps but there is no known family history of colon cancer.    The patient denies any  vomiting, epigastric pain, dysphagia, pyrosis or hematemesis.  The patient denies any fever or chills.  Denies any melena .  Denies any unintentional weight loss or loss of appetite.    Past Medical History:   Diagnosis Date   • Anxiety    • Back pain    • Candidiasis of vulva and vagina    • Constipation    • Contraception    • Depression    • Family history of colonic polyps    • GERD (gastroesophageal reflux disease)    • Migraine    • Visit for routine gyn exam    • Vitamin D deficiency    • White coat syndrome without diagnosis of hypertension        Past Surgical History:   Procedure Laterality Date   • CHOLECYSTECTOMY     • ENDOSCOPY N/A 7/10/2018    Dr. Spencer-LA Grade B reflux esophagitis-biopsied; Z-line regular-35cm from the  "incisors; No gross lesions in the stomach-biopsied; No gross lesions in the second portion of the duodenum   • OTHER SURGICAL HISTORY      reset arm   • PAP SMEAR  07/23/2012   • WISDOM TOOTH EXTRACTION           Current Outpatient Medications:   •  levonorgestrel-ethinyl estradiol (AVIANE) 0.1-20 MG-MCG per tablet, Take 1 tablet by mouth Daily., Disp: , Rfl:   •  sodium-potassium-magnesium sulfates (SUPREP BOWEL PREP KIT) 17.5-3.13-1.6 GM/177ML solution oral solution, Take 1 bottle by mouth Every 12 (Twelve) Hours. Split dose prep as directed by office instructions provided.  2 bottles = one kit., Disp: 2 bottle, Rfl: 0    Allergies   Allergen Reactions   • Amoxicillin Rash   • Penicillins Rash       Social History     Socioeconomic History   • Marital status: Single     Spouse name: Not on file   • Number of children: Not on file   • Years of education: Not on file   • Highest education level: Not on file   Tobacco Use   • Smoking status: Never Smoker   • Smokeless tobacco: Never Used   Substance and Sexual Activity   • Alcohol use: Yes     Comment: Occasionally   • Drug use: No   • Sexual activity: Defer     Birth control/protection: None       Family History   Problem Relation Age of Onset   • Hypertension Mother    • Depression Mother    • Colon polyps Mother    • Diabetes Other         type 2   • Hypertension Father    • Hyperlipidemia Father    • Diabetes Father    • Breast cancer Neg Hx    • Ovarian cancer Neg Hx    • Uterine cancer Neg Hx    • Colon cancer Neg Hx    • Esophageal cancer Neg Hx    • Liver cancer Neg Hx    • Liver disease Neg Hx    • Rectal cancer Neg Hx    • Stomach cancer Neg Hx        Vitals:    01/30/20 0945   BP: 126/82   BP Location: Left arm   Patient Position: Sitting   Cuff Size: Adult   Pulse: 99   Temp: 98.1 °F (36.7 °C)   TempSrc: Tympanic   SpO2: 99%   Weight: 124 kg (274 lb)   Height: 165.1 cm (65\")       Review of Systems:    General:    Present -feeling well   Skin:    Not " Present-Rash   HEENT:     Not Present-Acute visual changes or Acute hearing changes   Neck :    Not Present- swollen glands   Genitourinary:      Not Present- burning, frequency, urgency hematuria, dysuria,   Cardiovascular:   Not Present-chest pain, palpitations, or pressure   Respiratory:   Not Present- shortness of breath or cough   Gastrointestinal:  Musculoskeletal:  Neurological:  Psychiatric:   Present as mentioned in the HP    Not Present. Recent gait disturbances.    Not Present-Seizures and weakness in extremities.    Not Present- Anxiety or Depression.       Physical Exam:    General Appearance:    Alert, cooperative, in no acute distress   Psych:    Mood appropriate    Eyes:          conjunctivae and sclerae normal, no   icterus, no pallor   ENMT:    Ears appear intact with no abnormalities noted oral mucosa moist   Neck:   No adenopathy, supple, trachea midline, no thyromegaly, no   carotid bruit, no JVD    Cardiovascular:    Regular rhythm and normal rate, normal S1 and S2, no            murmur, no gallop, no rub, no click   Gastrointestinal:     Inspection normal.  Normal bowel sounds, no masses, no organomegaly, soft round non-tender, non-distended, no guarding, no rebound or tenderness. No hepatosplenomegaly.   Skin:   No bleeding, bruising or rash.  Negative pain on rectal exam no external hemorrhoids.   Neurologic:   nonfocal       Lab Results   Component Value Date    WBC 7.04 03/14/2018    HGB 13.0 03/14/2018    HCT 40.5 03/14/2018     03/14/2018        Lab Results   Component Value Date     03/14/2018    K 4.1 03/14/2018     03/14/2018    CO2 27.0 03/14/2018    BUN 9 03/14/2018    CREATININE 0.69 03/14/2018    BILITOT 0.3 03/14/2018    ALKPHOS 98 03/14/2018    ALT 39 03/14/2018    AST 23 03/14/2018       No results found for: INR    Body mass index is 45.6 kg/m². Patient's Body mass index is 45.6 kg/m². BMI is above normal parameters. Recommendations include: nutrition  counseling.    Assessment and Plan:  Assessment/Plan   Jinny was seen today for rectal bleeding, nausea and rectal pain.    Diagnoses and all orders for this visit:    Rectal bleeding  Comments:  We will schedule colonoscopy  Orders:  -     Case Request; Standing  -     Case Request    Anal or rectal pain  -     Case Request; Standing  -     Case Request    Family history of polyps in the colon  Comments:  mother   Orders:  -     Case Request; Standing  -     Case Request    Other orders  -     Follow Anesthesia Guidelines / Standing Orders; Future  -     Obtain Informed Consent; Future  -     sodium-potassium-magnesium sulfates (SUPREP BOWEL PREP KIT) 17.5-3.13-1.6 GM/177ML solution oral solution; Take 1 bottle by mouth Every 12 (Twelve) Hours. Split dose prep as directed by office instructions provided.  2 bottles = one kit.             There are no Patient Instructions on file for this visit.    Next follow-up appointment      The risks, benefits, and alternatives of colonoscopy were reviewed with the patient today.  Risks including perforation of the colon possibly requiring surgery or colostomy.  Additional risks include risk of bleeding from biopsies or removal of colon tissue.  There is also the risk of a drug reaction or problems with anesthesia.  This will be discussed with the further by the anesthesia team on the day of the procedure.  Lastly there is a possibility of missing a colon polyp or cancer.  The benefits include the diagnosis and management of disease of the colon and rectum.  Alternatives to colonoscopy include barium enema, laboratory testing, radiographic evaluation, or no intervention.  The patient verbalizes understanding and agrees.      EMR Dragon/Transcription disclaimer:  Much of this encounter note is an electronic transcription/translation of spoken language to printed text. The electronic translation of spoken language may permit erroneous, or at times, nonsensical words or phrases  to be inadvertently transcribed; although I have reviewed the note for such errors, some may still exist.

## 2020-02-13 ENCOUNTER — HOSPITAL ENCOUNTER (OUTPATIENT)
Facility: HOSPITAL | Age: 30
Setting detail: HOSPITAL OUTPATIENT SURGERY
Discharge: HOME | End: 2020-02-13
Attending: INTERNAL MEDICINE | Admitting: INTERNAL MEDICINE

## 2020-02-13 ENCOUNTER — TELEPHONE (OUTPATIENT)
Dept: GASTROENTEROLOGY | Facility: CLINIC | Age: 30
End: 2020-02-13

## 2020-02-13 ENCOUNTER — ANESTHESIA EVENT (OUTPATIENT)
Dept: GASTROENTEROLOGY | Facility: HOSPITAL | Age: 30
End: 2020-02-13

## 2020-02-13 ENCOUNTER — ANESTHESIA (OUTPATIENT)
Dept: GASTROENTEROLOGY | Facility: HOSPITAL | Age: 30
End: 2020-02-13

## 2020-02-13 VITALS
OXYGEN SATURATION: 99 % | HEART RATE: 84 BPM | BODY MASS INDEX: 45.48 KG/M2 | RESPIRATION RATE: 20 BRPM | SYSTOLIC BLOOD PRESSURE: 130 MMHG | HEIGHT: 65 IN | DIASTOLIC BLOOD PRESSURE: 86 MMHG | TEMPERATURE: 98.3 F | WEIGHT: 273 LBS

## 2020-02-13 LAB — B-HCG UR QL: NEGATIVE

## 2020-02-13 PROCEDURE — 45378 DIAGNOSTIC COLONOSCOPY: CPT | Performed by: INTERNAL MEDICINE

## 2020-02-13 PROCEDURE — 25010000002 PROPOFOL 10 MG/ML EMULSION: Performed by: NURSE ANESTHETIST, CERTIFIED REGISTERED

## 2020-02-13 PROCEDURE — 81025 URINE PREGNANCY TEST: CPT | Performed by: ANESTHESIOLOGY

## 2020-02-13 RX ORDER — LIDOCAINE HYDROCHLORIDE 10 MG/ML
0.5 INJECTION, SOLUTION EPIDURAL; INFILTRATION; INTRACAUDAL; PERINEURAL ONCE AS NEEDED
Status: DISCONTINUED | OUTPATIENT
Start: 2020-02-13 | End: 2020-02-13 | Stop reason: HOSPADM

## 2020-02-13 RX ORDER — PROPOFOL 10 MG/ML
VIAL (ML) INTRAVENOUS AS NEEDED
Status: DISCONTINUED | OUTPATIENT
Start: 2020-02-13 | End: 2020-02-13 | Stop reason: SURG

## 2020-02-13 RX ORDER — SODIUM CHLORIDE 9 MG/ML
500 INJECTION, SOLUTION INTRAVENOUS CONTINUOUS PRN
Status: DISCONTINUED | OUTPATIENT
Start: 2020-02-13 | End: 2020-02-13 | Stop reason: HOSPADM

## 2020-02-13 RX ORDER — SODIUM CHLORIDE 0.9 % (FLUSH) 0.9 %
10 SYRINGE (ML) INJECTION AS NEEDED
Status: DISCONTINUED | OUTPATIENT
Start: 2020-02-13 | End: 2020-02-13 | Stop reason: HOSPADM

## 2020-02-13 RX ADMIN — SODIUM CHLORIDE 500 ML: 9 INJECTION, SOLUTION INTRAVENOUS at 11:50

## 2020-02-13 RX ADMIN — PROPOFOL 450 MG: 10 INJECTION, EMULSION INTRAVENOUS at 13:23

## 2020-02-13 NOTE — ANESTHESIA PREPROCEDURE EVALUATION
Anesthesia Evaluation     Patient summary reviewed   no history of anesthetic complications:  NPO Solid Status: > 8 hours             Airway   Mallampati: II  TM distance: >3 FB  Neck ROM: full  Dental      Pulmonary - negative pulmonary ROS   Cardiovascular - negative cardio ROS  Exercise tolerance: excellent (>7 METS)        Neuro/Psych- negative ROS  GI/Hepatic/Renal/Endo    (+) morbid obesity,      Musculoskeletal     Abdominal    Substance History      OB/GYN          Other                        Anesthesia Plan    ASA 3     MAC       Anesthetic plan, all risks, benefits, and alternatives have been provided, discussed and informed consent has been obtained with: patient.

## 2020-02-13 NOTE — ANESTHESIA POSTPROCEDURE EVALUATION
Patient: Jinny Rothman    Procedure Summary     Date:  02/13/20 Room / Location:   [facility] /  [facility]    Anesthesia Start:  1320 Anesthesia Stop:  1340    Procedure:  COLONOSCOPY WITH ANESTHESIA (N/A ) Diagnosis:       Rectal bleeding      Anal or rectal pain      Family history of polyps in the colon      (Rectal bleeding [K62.5])      (Anal or rectal pain [K62.89])      (Family history of polyps in the colon [Z83.71])    Surgeon:  Alfreda Osei MD Provider:  Car Perez CRNA    Anesthesia Type:  MAC ASA Status:  3          Anesthesia Type: MAC    Vitals  No vitals data found for the desired time range.          Post Anesthesia Care and Evaluation    Patient location during evaluation: PHASE II  Patient participation: complete - patient participated  Level of consciousness: awake and alert  Pain management: adequate  Airway patency: patent  Anesthetic complications: No anesthetic complications    Cardiovascular status: acceptable  Respiratory status: acceptable  Hydration status: acceptable

## 2020-07-02 ENCOUNTER — OFFICE VISIT (OUTPATIENT)
Dept: OBSTETRICS AND GYNECOLOGY | Facility: CLINIC | Age: 30
End: 2020-07-02

## 2020-07-02 VITALS
SYSTOLIC BLOOD PRESSURE: 136 MMHG | HEIGHT: 65 IN | BODY MASS INDEX: 46.48 KG/M2 | DIASTOLIC BLOOD PRESSURE: 70 MMHG | WEIGHT: 279 LBS

## 2020-07-02 DIAGNOSIS — Z30.015 ENCOUNTER FOR INITIAL PRESCRIPTION OF VAGINAL RING HORMONAL CONTRACEPTIVE: ICD-10-CM

## 2020-07-02 DIAGNOSIS — N92.6 IRREGULAR BLEEDING: ICD-10-CM

## 2020-07-02 DIAGNOSIS — Z87.42 HISTORY OF ABNORMAL CERVICAL PAP SMEAR: ICD-10-CM

## 2020-07-02 DIAGNOSIS — Z01.419 WELL WOMAN EXAM WITH ROUTINE GYNECOLOGICAL EXAM: Primary | ICD-10-CM

## 2020-07-02 LAB
B-HCG UR QL: NEGATIVE
INTERNAL NEGATIVE CONTROL: NEGATIVE
INTERNAL POSITIVE CONTROL: POSITIVE
Lab: NORMAL

## 2020-07-02 PROCEDURE — 81025 URINE PREGNANCY TEST: CPT | Performed by: NURSE PRACTITIONER

## 2020-07-02 PROCEDURE — 99395 PREV VISIT EST AGE 18-39: CPT | Performed by: NURSE PRACTITIONER

## 2020-07-02 PROCEDURE — G0123 SCREEN CERV/VAG THIN LAYER: HCPCS | Performed by: NURSE PRACTITIONER

## 2020-07-02 RX ORDER — ETONOGESTREL AND ETHINYL ESTRADIOL 11.7; 2.7 MG/1; MG/1
INSERT, EXTENDED RELEASE VAGINAL
Qty: 1 EACH | Refills: 12 | Status: SHIPPED | OUTPATIENT
Start: 2020-07-02 | End: 2021-05-26

## 2020-07-02 NOTE — PROGRESS NOTES
"      Jinny Rothman is a 29 y.o.      Chief Complaint   Patient presents with   • Gynecologic Exam     pt here for annual exam. Last pap on file here 10/27/2017 LGS[facility], had colpo in 2017, consistent with pap. pt reports that she has had paps done at [facility].           HPI -LMP 2020  Patient is taking Aviane and would  Like to get back on the Nuvaring.  She had  Pap LGSIL  with colpo/bx showing LGSIL koilocytotic changes.  She states she has [facility] and they have been normal.  She is sexually active and declines  STD screening.      The following portions of the patient's history were reviewed and updated as appropriate:vital signs, allergies, current medications, past family history, past medical history, past social history, past surgical history and problem list.      Current Outpatient Medications:   •  levonorgestrel-ethinyl estradiol (AVIANE) 0.1-20 MG-MCG per tablet, Take 1 tablet by mouth Daily., Disp: , Rfl:     Review of Systems   Constitutional: Negative for activity change, chills and fever.        Obese   HENT: Negative for dental problem, mouth sores and sinus pressure.    Eyes: Negative for discharge and redness.        Glasses   Respiratory: Negative for apnea, choking and stridor.    Gastrointestinal: Positive for GERD. Negative for abdominal distention, blood in stool and indigestion.   Endocrine: Negative for cold intolerance and polydipsia.   Genitourinary: Negative for breast lump, decreased urine volume, dysuria, hematuria and vaginal bleeding.   Musculoskeletal: Negative for arthralgias, neck pain and bursitis.   Skin: Negative for color change.   Neurological: Negative for dizziness, syncope, numbness and confusion.   Psychiatric/Behavioral: Negative for agitation, decreased concentration, hallucinations, self-injury and suicidal ideas.           Objective      /70   Ht 165.1 cm (65\")  "  Wt 127 kg (279 lb)   LMP 06/06/2020 (Exact Date)   Breastfeeding No   BMI 46.43 kg/m²       Physical Exam   Constitutional: She is oriented to person, place, and time. She appears well-developed and well-nourished.   Eyes: Right eye exhibits no discharge. Left eye exhibits no discharge.   Cardiovascular: Normal rate and regular rhythm.   Pulmonary/Chest: Effort normal. No stridor. No respiratory distress. Right breast exhibits no inverted nipple, no mass and no nipple discharge. Left breast exhibits no inverted nipple, no mass and no nipple discharge.   Abdominal: Soft. She exhibits no distension. There is no tenderness.   Genitourinary: Rectal exam shows no mass. There is no rash or tenderness on the right labia. There is no rash or tenderness on the left labia. Uterus is not deviated, enlarged, mobile or exhibiting a mass.   Cervix is not parous and not nulliparous. Cervix does not exhibit motion tenderness or discharge. Right adnexum displays no mass and no tenderness. Left adnexum displays no mass and no tenderness. No erythema in the vagina. No foreign body in the vagina. No signs of injury around the vagina. No vaginal discharge found.   Musculoskeletal: She exhibits no edema or deformity.   Neurological: She is alert and oriented to person, place, and time.   Skin: Skin is warm and dry.   Tattoo multiple   Psychiatric: She has a normal mood and affect. Her behavior is normal.   Nursing note and vitals reviewed.       Assessment/Plan       Jinny was seen today for gynecologic exam.    Diagnoses and all orders for this visit:    Well woman exam with routine gynecological exam  -     Liquid-based Pap Smear, Screening    Encounter for initial prescription of vaginal ring hormonal contraceptive  -     etonogestrel-ethinyl estradiol (NuvaRing) 0.12-0.015 MG/24HR vaginal ring; Insert vaginally and leave in place for 3 consecutive weeks, then remove for 1 week.    Irregular bleeding  Comments:      Orders:  -      POC Pregnancy, Urine  negative    History of abnormal cervical Pap smear  Comments:  2017  pap LGSIL   colpo/biopsy  LGSIL    [facility]  Normal.  Patient will start Nuvaring at time she is to start a new pack of pills.    Patient is counseled re: BSE, diet, exercise, mammogram, calcium and Vit. D3  Patient will call if she has AUB on  The Nuvaring after 3rd months.  She had problems taking the BCP daily.        7/2/2020 pap negative    Fatemeh Duffy, APRN  7/2/2020

## 2020-07-08 LAB
GEN CATEG CVX/VAG CYTO-IMP: NORMAL
LAB AP CASE REPORT: NORMAL
LAB AP GYN ADDITIONAL INFORMATION: NORMAL
LAB AP GYN OTHER FINDINGS: NORMAL
PATH INTERP SPEC-IMP: NORMAL
STAT OF ADQ CVX/VAG CYTO-IMP: NORMAL

## 2020-11-23 ENCOUNTER — OFFICE VISIT (OUTPATIENT)
Age: 30
End: 2020-11-23

## 2020-11-23 VITALS — TEMPERATURE: 97.7 F

## 2020-11-27 LAB — SARS-COV-2, NAA: DETECTED

## 2021-04-21 ENCOUNTER — OFFICE VISIT (OUTPATIENT)
Dept: OBSTETRICS AND GYNECOLOGY | Facility: CLINIC | Age: 31
End: 2021-04-21

## 2021-04-21 VITALS
HEIGHT: 65 IN | WEIGHT: 282 LBS | SYSTOLIC BLOOD PRESSURE: 138 MMHG | BODY MASS INDEX: 46.98 KG/M2 | DIASTOLIC BLOOD PRESSURE: 74 MMHG

## 2021-04-21 DIAGNOSIS — N92.1 BREAKTHROUGH BLEEDING WITH NUVARING: Primary | ICD-10-CM

## 2021-04-21 DIAGNOSIS — Z97.5 BREAKTHROUGH BLEEDING WITH NUVARING: Primary | ICD-10-CM

## 2021-04-21 PROCEDURE — 99213 OFFICE O/P EST LOW 20 MIN: CPT | Performed by: OBSTETRICS & GYNECOLOGY

## 2021-04-21 RX ORDER — MULTIPLE VITAMINS W/ MINERALS TAB 9MG-400MCG
1 TAB ORAL DAILY
COMMUNITY

## 2021-04-21 NOTE — PROGRESS NOTES
"Kesha Rothman is a 30 y.o. female.     Chief Complaint   Patient presents with   • Hot Flashes     PT is here for hot flashes night sweats  Pt does have the nuvaring and is having some spotting with the nuvaring  PT states that she is having pain with intercourse and is very dry.  PT states she is having trouble sleeping.         30-year-old female  0 para 0 last menstrual period 2021 presents with complaints of hot flashes and night sweats.  She has been on the NuvaRing for several months now.  She reports that over the past several months she has noticed hot flashes and night sweats.  She also reports vaginal dryness and pain with intercourse.  She has tried using different types of lubrication without success.  She also reports that while on the NuvaRing she is having intermittent spotting.  Her last Pap smear was in  and was negative for intraepithelial lesion.       Review of Systems   Genitourinary: Positive for dyspareunia, vaginal bleeding, vaginal discharge and vaginal pain.       Objective   /74   Ht 165.1 cm (65\")   Wt 128 kg (282 lb)   LMP 2021 (Approximate)   BMI 46.93 kg/m²   Patient's last menstrual period was 2021 (approximate).  Physical Exam  Vitals and nursing note reviewed.   Constitutional:       General: She is not in acute distress.     Appearance: She is well-developed.   HENT:      Head: Normocephalic and atraumatic.   Eyes:      General:         Right eye: No discharge.         Left eye: No discharge.      Conjunctiva/sclera: Conjunctivae normal.   Cardiovascular:      Rate and Rhythm: Normal rate and regular rhythm.   Pulmonary:      Effort: Pulmonary effort is normal.   Genitourinary:     Comments: Decline pelvic examination  Musculoskeletal:         General: Normal range of motion.      Cervical back: Normal range of motion and neck supple.   Skin:     General: Skin is warm and dry.   Neurological:      Mental Status: She is " alert and oriented to person, place, and time.   Psychiatric:         Behavior: Behavior normal.         Judgment: Judgment normal.           Assessment/Plan   Problems Addressed this Visit     None      Visit Diagnoses     Breakthrough bleeding with NuvaRing    -  Primary      Diagnoses       Codes Comments    Breakthrough bleeding with NuvaRing    -  Primary ICD-10-CM: N92.1, Z97.5  ICD-9-CM: 626.6       Discussed with patient that since she is currently on birth control would not recommend checking a hormone panel at this time.  Discussed with patient that it would likely be affected by the fact that she is currently on birth control.  Discussed with patient to help with the abnormal uterine bleeding I would recommend taking the NuvaRing out for 5 days and then replacing a new 1.  Offered patient vaginal cultures bacterial vaginosis.  Patient declined.  Encourage patient to use coconut oil for her vaginal dryness but also discussed with patient that birth control in general can cause vaginal dryness.  We will plan to have patient follow-up in 3 weeks with transvaginal ultrasound or sooner if symptoms fail to improve.  Discussed with patient that at that time she is still having these complaints I would likely need to do a pelvic to further help her with her symptoms       Ignacia Taylor, DO

## 2021-04-30 ENCOUNTER — TELEPHONE (OUTPATIENT)
Dept: OBSTETRICS AND GYNECOLOGY | Facility: CLINIC | Age: 31
End: 2021-04-30

## 2021-05-26 ENCOUNTER — OFFICE VISIT (OUTPATIENT)
Dept: OBSTETRICS AND GYNECOLOGY | Facility: CLINIC | Age: 31
End: 2021-05-26

## 2021-05-26 VITALS
BODY MASS INDEX: 46.98 KG/M2 | SYSTOLIC BLOOD PRESSURE: 132 MMHG | HEIGHT: 65 IN | DIASTOLIC BLOOD PRESSURE: 74 MMHG | WEIGHT: 282 LBS

## 2021-05-26 DIAGNOSIS — N92.6 IRREGULAR MENSES: Primary | ICD-10-CM

## 2021-05-26 DIAGNOSIS — E66.01 MORBID OBESITY WITH BODY MASS INDEX (BMI) OF 40.0 TO 44.9 IN ADULT (HCC): ICD-10-CM

## 2021-05-26 DIAGNOSIS — IMO0002 ORGASM DISORDER: ICD-10-CM

## 2021-05-26 DIAGNOSIS — Z30.44 ENCOUNTER FOR SURVEILLANCE OF VAGINAL RING HORMONAL CONTRACEPTIVE DEVICE: ICD-10-CM

## 2021-05-26 PROBLEM — F52.31 FEMALE ORGASMIC DISORDER: Status: ACTIVE | Noted: 2021-05-26

## 2021-05-26 PROCEDURE — 81025 URINE PREGNANCY TEST: CPT | Performed by: NURSE PRACTITIONER

## 2021-05-26 PROCEDURE — 99214 OFFICE O/P EST MOD 30 MIN: CPT | Performed by: NURSE PRACTITIONER

## 2021-05-26 RX ORDER — ETONOGESTREL AND ETHINYL ESTRADIOL 11.7; 2.7 MG/1; MG/1
INSERT, EXTENDED RELEASE VAGINAL
Qty: 1 EACH | Refills: 12 | Status: SHIPPED | OUTPATIENT
Start: 2021-05-26

## 2021-05-26 NOTE — PROGRESS NOTES
Jinny Rothman is a 30 y.o.      Chief Complaint   Patient presents with   • Menstrual Problem     pt states that she stopped using the NuvaRing since seeing Dr Taylor last month. Pt states that she has bled 3 times in the past month since stopping the use of NuvaRing. pt had US in office this am.    • Dyspareunia     pt states that she is still having pain and cramping with IC. pt had US in office this am.            HPI - Patient stopped Nuvaring about a month ago per advice Dr. Taylor so that her hormone testing would be more accurate.  She has pain right after orgasm that started 3 months ago.  She has had 3 episodes like a period since stopping the Nuvaring. Her pg test is negative.  The following portions of the patient's history were reviewed and updated as appropriate:vital signs, allergies, current medications, past family history, past medical history, past social history, past surgical history and problem list.      Current Outpatient Medications:   •  multivitamin with minerals tablet tablet, Take 1 tablet by mouth Daily., Disp: , Rfl:   •  etonogestrel-ethinyl estradiol (NuvaRing) 0.12-0.015 MG/24HR vaginal ring, Insert vaginally and leave in place for 3 consecutive weeks, then remove for 1 week., Disp: 1 each, Rfl: 12    Review of Systems   Constitutional:        Obese   HENT: Negative for drooling, mouth sores, rhinorrhea and swollen glands.    Eyes: Negative for double vision and itching.   Respiratory: Negative for choking and stridor.    Gastrointestinal: Positive for GERD. Negative for abdominal distention and nausea.   Endocrine: Negative for heat intolerance.   Genitourinary: Positive for menstrual problem (3 periods since stopping Nuring) and vaginal pain (with orgasm just past 3 months will have pain right after orgasm). Negative for dysuria.   Musculoskeletal:        Back pain   Neurological: Positive for headache (patient reports migraines). Negative for seizures.  "  Psychiatric/Behavioral: Negative for dysphoric mood, hallucinations and suicidal ideas.        Depression treated elsewhere     Breast ROS: no galactorrhea    Objective      /74   Ht 165.1 cm (65\")   Wt 128 kg (282 lb)   LMP 05/26/2021 (Exact Date)   BMI 46.93 kg/m²       Physical Exam  Vitals and nursing note reviewed.   Constitutional:       Appearance: Normal appearance.   HENT:      Head: Normocephalic and atraumatic.      Nose: No congestion or rhinorrhea.      Mouth/Throat:      Pharynx: No oropharyngeal exudate or posterior oropharyngeal erythema.   Pulmonary:      Effort: Pulmonary effort is normal.   Musculoskeletal:         General: No swelling or tenderness.      Cervical back: No rigidity.   Skin:     Coloration: Skin is not jaundiced.   Neurological:      General: No focal deficit present.      Mental Status: She is alert and oriented to person, place, and time.   Psychiatric:         Mood and Affect: Mood normal.         Behavior: Behavior normal.          Assessment/Plan     Diagnoses and all orders for this visit:    1. Irregular menses (Primary)  -     CBC & Differential  -     Follicle Stimulating Hormone  -     Luteinizing Hormone  -     Hemoglobin A1c  -     Insulin, Total  -     TSH  -     T3, Free  -     Thyroid Peroxidase Antibody  -     Testosterone  -     Testosterone Free Direct  -     Cortisol  -     Comprehensive Metabolic Panel  -     POC Pregnancy, Urine  NEGATIVE    2. Morbid obesity with body mass index (BMI) of 40.0 to 44.9 in adult (CMS/HCC)    3. Orgasm disorder  Comments:  pain with orgasm (immediately after)    4. Encounter for surveillance of vaginal ring hormonal contraceptive device  -     etonogestrel-ethinyl estradiol (NuvaRing) 0.12-0.015 MG/24HR vaginal ring; Insert vaginally and leave in place for 3 consecutive weeks, then remove for 1 week.  Dispense: 1 each; Refill: 12 Start this on Sunday.    Patient is counseled re: the indications of the ordered blood " tests.  She is counseled that she may have BTB on the Nuvaring  for up to 3 months.    Patient will RTO as discusses.          Fatemeh Duffy, APRN  5/26/2021

## 2021-05-27 LAB
ALBUMIN SERPL-MCNC: 4.2 G/DL (ref 3.5–5.2)
ALBUMIN/GLOB SERPL: 1.6 G/DL
ALP SERPL-CCNC: 94 U/L (ref 39–117)
ALT SERPL-CCNC: 15 U/L (ref 1–33)
AST SERPL-CCNC: 13 U/L (ref 1–32)
BILIRUB SERPL-MCNC: 0.3 MG/DL (ref 0–1.2)
BUN SERPL-MCNC: 9 MG/DL (ref 6–20)
BUN/CREAT SERPL: 13.6 (ref 7–25)
CALCIUM SERPL-MCNC: 9.5 MG/DL (ref 8.6–10.5)
CHLORIDE SERPL-SCNC: 105 MMOL/L (ref 98–107)
CO2 SERPL-SCNC: 26.1 MMOL/L (ref 22–29)
CREAT SERPL-MCNC: 0.66 MG/DL (ref 0.57–1)
GLOBULIN SER CALC-MCNC: 2.6 GM/DL
GLUCOSE SERPL-MCNC: 97 MG/DL (ref 65–99)
POTASSIUM SERPL-SCNC: 4.5 MMOL/L (ref 3.5–5.2)
PROT SERPL-MCNC: 6.8 G/DL (ref 6–8.5)
SODIUM SERPL-SCNC: 139 MMOL/L (ref 136–145)

## 2021-05-30 LAB
BASOPHILS # BLD AUTO: 0.05 10*3/MM3 (ref 0–0.2)
BASOPHILS NFR BLD AUTO: 0.8 % (ref 0–1.5)
CORTIS SERPL-MCNC: 8.8 UG/DL
EOSINOPHIL # BLD AUTO: 0.1 10*3/MM3 (ref 0–0.4)
EOSINOPHIL NFR BLD AUTO: 1.6 % (ref 0.3–6.2)
ERYTHROCYTE [DISTWIDTH] IN BLOOD BY AUTOMATED COUNT: 11.9 % (ref 12.3–15.4)
FSH SERPL-ACNC: 9.6 MIU/ML
HBA1C MFR BLD: 5.3 % (ref 4.8–5.6)
HCT VFR BLD AUTO: 43.1 % (ref 34–46.6)
HGB BLD-MCNC: 13.8 G/DL (ref 12–15.9)
IMM GRANULOCYTES # BLD AUTO: 0 10*3/MM3 (ref 0–0.05)
IMM GRANULOCYTES NFR BLD AUTO: 0 % (ref 0–0.5)
INSULIN SERPL-ACNC: 19.8 UIU/ML (ref 2.6–24.9)
LH SERPL-ACNC: 10 MIU/ML
LYMPHOCYTES # BLD AUTO: 2.46 10*3/MM3 (ref 0.7–3.1)
LYMPHOCYTES NFR BLD AUTO: 38.2 % (ref 19.6–45.3)
MCH RBC QN AUTO: 28.5 PG (ref 26.6–33)
MCHC RBC AUTO-ENTMCNC: 32 G/DL (ref 31.5–35.7)
MCV RBC AUTO: 88.9 FL (ref 79–97)
MONOCYTES # BLD AUTO: 0.46 10*3/MM3 (ref 0.1–0.9)
MONOCYTES NFR BLD AUTO: 7.1 % (ref 5–12)
NEUTROPHILS # BLD AUTO: 3.37 10*3/MM3 (ref 1.7–7)
NEUTROPHILS NFR BLD AUTO: 52.3 % (ref 42.7–76)
NRBC BLD AUTO-RTO: 0 /100 WBC (ref 0–0.2)
PLATELET # BLD AUTO: 338 10*3/MM3 (ref 140–450)
RBC # BLD AUTO: 4.85 10*6/MM3 (ref 3.77–5.28)
T3FREE SERPL-MCNC: 3.5 PG/ML (ref 2–4.4)
TESTOST FREE SERPL-MCNC: 1.5 PG/ML (ref 0–4.2)
TESTOST SERPL-MCNC: 32 NG/DL (ref 13–71)
THYROPEROXIDASE AB SERPL-ACNC: 10 IU/ML (ref 0–34)
TSH SERPL DL<=0.005 MIU/L-ACNC: 0.62 UIU/ML (ref 0.27–4.2)
WBC # BLD AUTO: 6.44 10*3/MM3 (ref 3.4–10.8)

## 2021-06-02 ENCOUNTER — OFFICE VISIT (OUTPATIENT)
Dept: OBSTETRICS AND GYNECOLOGY | Facility: CLINIC | Age: 31
End: 2021-06-02

## 2021-06-02 VITALS
DIASTOLIC BLOOD PRESSURE: 74 MMHG | BODY MASS INDEX: 46.98 KG/M2 | HEIGHT: 65 IN | SYSTOLIC BLOOD PRESSURE: 132 MMHG | WEIGHT: 282 LBS

## 2021-06-02 DIAGNOSIS — F52.31 FEMALE ORGASMIC DISORDER: Primary | ICD-10-CM

## 2021-06-02 DIAGNOSIS — R63.5 WEIGHT INCREASE: ICD-10-CM

## 2021-06-02 DIAGNOSIS — R10.2 PELVIC PAIN: ICD-10-CM

## 2021-06-02 DIAGNOSIS — N94.6 DYSMENORRHEA: ICD-10-CM

## 2021-06-02 PROCEDURE — 87491 CHLMYD TRACH DNA AMP PROBE: CPT | Performed by: NURSE PRACTITIONER

## 2021-06-02 PROCEDURE — 99213 OFFICE O/P EST LOW 20 MIN: CPT | Performed by: NURSE PRACTITIONER

## 2021-06-02 PROCEDURE — 87661 TRICHOMONAS VAGINALIS AMPLIF: CPT | Performed by: NURSE PRACTITIONER

## 2021-06-02 PROCEDURE — 87591 N.GONORRHOEAE DNA AMP PROB: CPT | Performed by: NURSE PRACTITIONER

## 2021-06-02 NOTE — PROGRESS NOTES
Jinny Rothman is a 30 y.o.      Chief Complaint   Patient presents with   • Follow-up     pt states that she is here to f/u with vaginal cultures. pt states that she was bleeding at last visit and did not have cultures collected. pt did report that she started the NuvaRing on .            HPI - Patient returns to go over lab.  She just started back on the Nuvaring.  She has dysorgasmia right after orgasm for 20 seconds for past 3 months.      The following portions of the patient's history were reviewed and updated as appropriate:vital signs, allergies, current medications, past family history, past medical history, past social history, past surgical history and problem list.      Current Outpatient Medications:   •  etonogestrel-ethinyl estradiol (NuvaRing) 0.12-0.015 MG/24HR vaginal ring, Insert vaginally and leave in place for 3 consecutive weeks, then remove for 1 week., Disp: 1 each, Rfl: 12  •  multivitamin with minerals tablet tablet, Take 1 tablet by mouth Daily., Disp: , Rfl:   •  metFORMIN (Glucophage) 500 MG tablet, 1 po daily in the am with food for 10 days then bid with food thereafter, Disp: 60 tablet, Rfl: 2    Review of Systems   Constitutional: Negative for appetite change and fever.        Obese   HENT: Negative for drooling, mouth sores, rhinorrhea and swollen glands.    Eyes: Negative for double vision, photophobia and itching.   Respiratory: Negative for choking and stridor.    Gastrointestinal: Positive for constipation and GERD. Negative for abdominal distention and nausea.   Endocrine: Negative for heat intolerance.   Genitourinary: Positive for menstrual problem (3 periods since stopping Nuring , patient reinserted Nuvaring   dysmenorrhea) and vaginal pain (with orgasm just past 3 months will have pain right after orgasm). Negative for breast lump and dysuria.   Musculoskeletal:        Back pain   Neurological: Positive for headache (patient reports migraines).  "Negative for dizziness, seizures and syncope.   Psychiatric/Behavioral: Negative for dysphoric mood, hallucinations, self-injury and suicidal ideas.        Depression treated elsewhere         Objective      /74   Ht 165.1 cm (65\")   Wt 128 kg (282 lb)   LMP 05/26/2021 (Exact Date)   BMI 46.93 kg/m²       Physical Exam  Vitals and nursing note reviewed.   Constitutional:       General: She is not in acute distress.     Appearance: She is well-developed. She is obese.   HENT:      Head: Normocephalic and atraumatic.      Nose: No congestion or rhinorrhea.   Eyes:      General:         Right eye: No discharge.         Left eye: No discharge.   Pulmonary:      Effort: Pulmonary effort is normal.   Abdominal:      Palpations: Abdomen is soft.      Tenderness: There is no abdominal tenderness.   Genitourinary:     Exam position: Lithotomy position.      Labia:         Right: No tenderness or lesion.         Left: No tenderness or lesion.       Vagina: Normal. No signs of injury. No vaginal discharge, tenderness or bleeding.      Cervix: No cervical motion tenderness, discharge, friability or lesion.      Uterus: Not deviated, not enlarged, not fixed and not tender.       Adnexa:         Right: No tenderness or fullness.          Left: No tenderness or fullness.        Rectum: No mass.   Skin:     General: Skin is warm.   Neurological:      General: No focal deficit present.      Mental Status: She is alert.   Psychiatric:         Mood and Affect: Mood normal.          Assessment/Plan     ASSESSMENT/PLAN    Diagnoses and all orders for this visit:    1. 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 Weight increase  Comments:  BMI 46  " Patient's labs reviewed and she does not appear to have PCOS or metabolic syndrome.    She took Metformin in the past and will restart.  She is counsele  Orders:  -     metFORMIN (Glucophage) 500 MG tablet; 1 po daily in the am with food for 10 days then bid with food thereafter  Dispense: 60 tablet; Refill: 2  -     Gynecologic Fluid, Supplemental Testing    3. Dysmenorrhea  Comments:  US normal, no fibroids  Patient can take Anaprox for period pain  Orders:  -     Cancel: Gynecologic Fluid, Supplemental Testing  -     Gynecologic Fluid, Supplemental Testing    4. Pelvic pain  -     Urine Culture, Comprehen ([facility]) - Urine, Clean Catch      RTO July for annual and pap.  RtO 3 months for Metformin check.  Counseled re: low carb/ low calorie diet and side effects of Metformin.  Patient voiced understanding.        Fatemeh Duffy, APRN  6/2/2021

## 2021-06-03 LAB
APPEARANCE UR: CLEAR
BACTERIA #/AREA URNS HPF: NORMAL /HPF
BILIRUB UR QL STRIP: NEGATIVE
C TRACH RRNA CVX QL NAA+PROBE: NOT DETECTED
CASTS URNS MICRO: NORMAL
COLOR UR: YELLOW
EPI CELLS #/AREA URNS HPF: NORMAL /HPF
GLUCOSE UR QL: NEGATIVE
HGB UR QL STRIP: NEGATIVE
KETONES UR QL STRIP: NEGATIVE
LEUKOCYTE ESTERASE UR QL STRIP: NEGATIVE
N GONORRHOEA RRNA SPEC QL NAA+PROBE: NOT DETECTED
NITRITE UR QL STRIP: NEGATIVE
PH UR STRIP: 6.5 [PH] (ref 5–8)
PROT UR QL STRIP: NEGATIVE
RBC #/AREA URNS HPF: NORMAL /HPF
SP GR UR: 1.03 (ref 1–1.03)
TRICHOMONAS VAGINALIS PCR: NOT DETECTED
UROBILINOGEN UR STRIP-MCNC: NORMAL MG/DL
WBC #/AREA URNS HPF: NORMAL /HPF

## 2021-06-04 LAB
BACTERIA UR CULT: NORMAL
BACTERIA UR CULT: NORMAL

## 2021-07-28 LAB
LAB AP CASE REPORT: NORMAL
LAB AP GYN ADDITIONAL INFORMATION: NORMAL
Lab: NORMAL

## 2021-09-29 DIAGNOSIS — R63.5 WEIGHT INCREASE: ICD-10-CM

## 2023-09-29 ENCOUNTER — HOSPITAL ENCOUNTER (INPATIENT)
Age: 33
LOS: 3 days | Discharge: HOME | DRG: 751 | End: 2023-10-02
Attending: EMERGENCY MEDICINE | Admitting: PSYCHIATRY & NEUROLOGY
Payer: MEDICAID

## 2023-09-29 DIAGNOSIS — R45.851 DEPRESSION WITH SUICIDAL IDEATION: Primary | ICD-10-CM

## 2023-09-29 DIAGNOSIS — F32.A DEPRESSION WITH SUICIDAL IDEATION: Primary | ICD-10-CM

## 2023-09-29 PROBLEM — T14.91XA SUICIDAL BEHAVIOR WITH ATTEMPTED SELF-INJURY (HCC): Status: ACTIVE | Noted: 2023-09-29

## 2023-09-29 LAB
ALBUMIN SERPL-MCNC: 4.3 G/DL (ref 3.5–5.2)
ALP SERPL-CCNC: 116 U/L (ref 35–104)
ALT SERPL-CCNC: 11 U/L (ref 5–33)
AMPHET UR QL SCN: NEGATIVE
ANION GAP SERPL CALCULATED.3IONS-SCNC: 9 MMOL/L (ref 7–19)
APAP SERPL-MCNC: <5 UG/ML (ref 10–30)
AST SERPL-CCNC: 15 U/L (ref 5–32)
BARBITURATES UR QL SCN: NEGATIVE
BASOPHILS # BLD: 0.1 K/UL (ref 0–0.2)
BASOPHILS NFR BLD: 0.6 % (ref 0–1)
BENZODIAZ UR QL SCN: POSITIVE
BILIRUB SERPL-MCNC: 0.3 MG/DL (ref 0.2–1.2)
BUN SERPL-MCNC: 9 MG/DL (ref 6–20)
BUPRENORPHINE URINE: NEGATIVE
CALCIUM SERPL-MCNC: 9.2 MG/DL (ref 8.6–10)
CANNABINOIDS UR QL SCN: NEGATIVE
CHLORIDE SERPL-SCNC: 103 MMOL/L (ref 98–111)
CO2 SERPL-SCNC: 27 MMOL/L (ref 22–29)
COCAINE UR QL SCN: NEGATIVE
CREAT SERPL-MCNC: 0.9 MG/DL (ref 0.5–0.9)
DRUG SCREEN COMMENT UR-IMP: ABNORMAL
EOSINOPHIL # BLD: 0.1 K/UL (ref 0–0.6)
EOSINOPHIL NFR BLD: 0.9 % (ref 0–5)
ERYTHROCYTE [DISTWIDTH] IN BLOOD BY AUTOMATED COUNT: 13.9 % (ref 11.5–14.5)
ETHANOLAMINE SERPL-MCNC: <10 MG/DL (ref 0–0.08)
FENTANYL SCREEN, URINE: NEGATIVE
GLUCOSE SERPL-MCNC: 96 MG/DL (ref 74–109)
HCG SERPL QL: NEGATIVE
HCT VFR BLD AUTO: 41.6 % (ref 37–47)
HGB BLD-MCNC: 13.1 G/DL (ref 12–16)
IMM GRANULOCYTES # BLD: 0 K/UL
LYMPHOCYTES # BLD: 2.1 K/UL (ref 1.1–4.5)
LYMPHOCYTES NFR BLD: 23.5 % (ref 20–40)
MCH RBC QN AUTO: 27.5 PG (ref 27–31)
MCHC RBC AUTO-ENTMCNC: 31.5 G/DL (ref 33–37)
MCV RBC AUTO: 87.4 FL (ref 81–99)
METHADONE UR QL SCN: NEGATIVE
METHAMPHETAMINE, URINE: NEGATIVE
MONOCYTES # BLD: 0.7 K/UL (ref 0–0.9)
MONOCYTES NFR BLD: 7.9 % (ref 0–10)
NEUTROPHILS # BLD: 5.9 K/UL (ref 1.5–7.5)
NEUTS SEG NFR BLD: 66.9 % (ref 50–65)
OPIATES UR QL SCN: NEGATIVE
OXYCODONE UR QL SCN: NEGATIVE
PCP UR QL SCN: NEGATIVE
PLATELET # BLD AUTO: 340 K/UL (ref 130–400)
PMV BLD AUTO: 10.5 FL (ref 9.4–12.3)
POTASSIUM SERPL-SCNC: 4.4 MMOL/L (ref 3.5–5)
PROT SERPL-MCNC: 7.2 G/DL (ref 6.6–8.7)
RBC # BLD AUTO: 4.76 M/UL (ref 4.2–5.4)
SALICYLATES SERPL-MCNC: <0.3 MG/DL (ref 3–10)
SARS-COV-2 RDRP RESP QL NAA+PROBE: NOT DETECTED
SODIUM SERPL-SCNC: 139 MMOL/L (ref 136–145)
TRICYCLIC, URINE: NEGATIVE
WBC # BLD AUTO: 8.7 K/UL (ref 4.8–10.8)

## 2023-09-29 PROCEDURE — 80306 DRUG TEST PRSMV INSTRMNT: CPT

## 2023-09-29 PROCEDURE — 6370000000 HC RX 637 (ALT 250 FOR IP): Performed by: PSYCHIATRY & NEUROLOGY

## 2023-09-29 PROCEDURE — 80179 DRUG ASSAY SALICYLATE: CPT

## 2023-09-29 PROCEDURE — 80053 COMPREHEN METABOLIC PANEL: CPT

## 2023-09-29 PROCEDURE — 6360000002 HC RX W HCPCS: Performed by: EMERGENCY MEDICINE

## 2023-09-29 PROCEDURE — 99285 EMERGENCY DEPT VISIT HI MDM: CPT

## 2023-09-29 PROCEDURE — 82077 ASSAY SPEC XCP UR&BREATH IA: CPT

## 2023-09-29 PROCEDURE — 80143 DRUG ASSAY ACETAMINOPHEN: CPT

## 2023-09-29 PROCEDURE — 1240000000 HC EMOTIONAL WELLNESS R&B

## 2023-09-29 PROCEDURE — 84703 CHORIONIC GONADOTROPIN ASSAY: CPT

## 2023-09-29 PROCEDURE — 16020 DRESS/DEBRID P-THICK BURN S: CPT

## 2023-09-29 PROCEDURE — 87635 SARS-COV-2 COVID-19 AMP PRB: CPT

## 2023-09-29 PROCEDURE — 90471 IMMUNIZATION ADMIN: CPT | Performed by: EMERGENCY MEDICINE

## 2023-09-29 PROCEDURE — 90715 TDAP VACCINE 7 YRS/> IM: CPT | Performed by: EMERGENCY MEDICINE

## 2023-09-29 PROCEDURE — 85025 COMPLETE CBC W/AUTO DIFF WBC: CPT

## 2023-09-29 PROCEDURE — 36415 COLL VENOUS BLD VENIPUNCTURE: CPT

## 2023-09-29 RX ORDER — POLYETHYLENE GLYCOL 3350 17 G/17G
17 POWDER, FOR SOLUTION ORAL DAILY PRN
Status: DISCONTINUED | OUTPATIENT
Start: 2023-09-29 | End: 2023-10-02 | Stop reason: HOSPADM

## 2023-09-29 RX ORDER — MECOBALAMIN 5000 MCG
5 TABLET,DISINTEGRATING ORAL NIGHTLY
OUTPATIENT
Start: 2023-09-29

## 2023-09-29 RX ORDER — HYDROXYZINE HYDROCHLORIDE 25 MG/1
25 TABLET, FILM COATED ORAL 3 TIMES DAILY PRN
Status: DISCONTINUED | OUTPATIENT
Start: 2023-09-29 | End: 2023-10-02 | Stop reason: HOSPADM

## 2023-09-29 RX ORDER — ACETAMINOPHEN 325 MG/1
650 TABLET ORAL EVERY 4 HOURS PRN
OUTPATIENT
Start: 2023-09-29

## 2023-09-29 RX ORDER — ACETAMINOPHEN 325 MG/1
650 TABLET ORAL EVERY 4 HOURS PRN
Status: DISCONTINUED | OUTPATIENT
Start: 2023-09-29 | End: 2023-10-02 | Stop reason: HOSPADM

## 2023-09-29 RX ORDER — POLYETHYLENE GLYCOL 3350 17 G
2 POWDER IN PACKET (EA) ORAL
OUTPATIENT
Start: 2023-09-29

## 2023-09-29 RX ORDER — NICOTINE 21 MG/24HR
1 PATCH, TRANSDERMAL 24 HOURS TRANSDERMAL DAILY
OUTPATIENT
Start: 2023-09-29

## 2023-09-29 RX ORDER — ACETAMINOPHEN 325 MG/1
650 TABLET ORAL EVERY 4 HOURS PRN
Status: DISCONTINUED | OUTPATIENT
Start: 2023-09-29 | End: 2023-09-29

## 2023-09-29 RX ORDER — TRAZODONE HYDROCHLORIDE 50 MG/1
50 TABLET ORAL NIGHTLY
OUTPATIENT
Start: 2023-09-29

## 2023-09-29 RX ORDER — HYDROXYZINE HYDROCHLORIDE 25 MG/1
25 TABLET, FILM COATED ORAL EVERY 4 HOURS PRN
OUTPATIENT
Start: 2023-09-29

## 2023-09-29 RX ORDER — MECOBALAMIN 5000 MCG
5 TABLET,DISINTEGRATING ORAL NIGHTLY
Status: DISCONTINUED | OUTPATIENT
Start: 2023-09-29 | End: 2023-10-02 | Stop reason: HOSPADM

## 2023-09-29 RX ORDER — POLYETHYLENE GLYCOL 3350 17 G/17G
17 POWDER, FOR SOLUTION ORAL DAILY PRN
OUTPATIENT
Start: 2023-09-29

## 2023-09-29 RX ORDER — TRAZODONE HYDROCHLORIDE 50 MG/1
50 TABLET ORAL NIGHTLY
Status: DISCONTINUED | OUTPATIENT
Start: 2023-09-29 | End: 2023-09-30

## 2023-09-29 RX ADMIN — TETANUS TOXOID, REDUCED DIPHTHERIA TOXOID AND ACELLULAR PERTUSSIS VACCINE, ADSORBED 0.5 ML: 5; 2.5; 8; 8; 2.5 SUSPENSION INTRAMUSCULAR at 15:09

## 2023-09-29 RX ADMIN — HYDROXYZINE HYDROCHLORIDE 25 MG: 25 TABLET, FILM COATED ORAL at 20:31

## 2023-09-29 RX ADMIN — ACETAMINOPHEN 650 MG: 325 TABLET ORAL at 20:30

## 2023-09-29 RX ADMIN — Medication 5 MG: at 20:30

## 2023-09-29 RX ADMIN — TRAZODONE HYDROCHLORIDE 50 MG: 50 TABLET ORAL at 20:31

## 2023-09-29 SDOH — ECONOMIC STABILITY: INCOME INSECURITY: HOW HARD IS IT FOR YOU TO PAY FOR THE VERY BASICS LIKE FOOD, HOUSING, MEDICAL CARE, AND HEATING?: NOT HARD AT ALL

## 2023-09-29 SDOH — ECONOMIC STABILITY: FOOD INSECURITY: WITHIN THE PAST 12 MONTHS, YOU WORRIED THAT YOUR FOOD WOULD RUN OUT BEFORE YOU GOT MONEY TO BUY MORE.: NEVER TRUE

## 2023-09-29 SDOH — ECONOMIC STABILITY: INCOME INSECURITY: IN THE PAST 12 MONTHS, HAS THE ELECTRIC, GAS, OIL, OR WATER COMPANY THREATENED TO SHUT OFF SERVICE IN YOUR HOME?: NO

## 2023-09-29 ASSESSMENT — PATIENT HEALTH QUESTIONNAIRE - PHQ9
5. POOR APPETITE OR OVEREATING: 3
5. POOR APPETITE OR OVEREATING: 3
SUM OF ALL RESPONSES TO PHQ QUESTIONS 1-9: 24
9. THOUGHTS THAT YOU WOULD BE BETTER OFF DEAD, OR OF HURTING YOURSELF: 1
4. FEELING TIRED OR HAVING LITTLE ENERGY: 3
7. TROUBLE CONCENTRATING ON THINGS, SUCH AS READING THE NEWSPAPER OR WATCHING TELEVISION: 3
9. THOUGHTS THAT YOU WOULD BE BETTER OFF DEAD, OR OF HURTING YOURSELF: 1
2. FEELING DOWN, DEPRESSED OR HOPELESS: 3
SUM OF ALL RESPONSES TO PHQ QUESTIONS 1-9: 25
10. IF YOU CHECKED OFF ANY PROBLEMS, HOW DIFFICULT HAVE THESE PROBLEMS MADE IT FOR YOU TO DO YOUR WORK, TAKE CARE OF THINGS AT HOME, OR GET ALONG WITH OTHER PEOPLE: 1
1. LITTLE INTEREST OR PLEASURE IN DOING THINGS: 3
2. FEELING DOWN, DEPRESSED OR HOPELESS: 3
1. LITTLE INTEREST OR PLEASURE IN DOING THINGS: 3
SUM OF ALL RESPONSES TO PHQ QUESTIONS 1-9: 27
3. TROUBLE FALLING OR STAYING ASLEEP: 3
3. TROUBLE FALLING OR STAYING ASLEEP: 3
SUM OF ALL RESPONSES TO PHQ9 QUESTIONS 1 & 2: 6
7. TROUBLE CONCENTRATING ON THINGS, SUCH AS READING THE NEWSPAPER OR WATCHING TELEVISION: 1
SUM OF ALL RESPONSES TO PHQ QUESTIONS 1-9: 16
8. MOVING OR SPEAKING SO SLOWLY THAT OTHER PEOPLE COULD HAVE NOTICED. OR THE OPPOSITE, BEING SO FIGETY OR RESTLESS THAT YOU HAVE BEEN MOVING AROUND A LOT MORE THAN USUAL: 3
10. IF YOU CHECKED OFF ANY PROBLEMS, HOW DIFFICULT HAVE THESE PROBLEMS MADE IT FOR YOU TO DO YOUR WORK, TAKE CARE OF THINGS AT HOME, OR GET ALONG WITH OTHER PEOPLE: 1
9. THOUGHTS THAT YOU WOULD BE BETTER OFF DEAD, OR OF HURTING YOURSELF: 3
SUM OF ALL RESPONSES TO PHQ QUESTIONS 1-9: 24
7. TROUBLE CONCENTRATING ON THINGS, SUCH AS READING THE NEWSPAPER OR WATCHING TELEVISION: 3
6. FEELING BAD ABOUT YOURSELF - OR THAT YOU ARE A FAILURE OR HAVE LET YOURSELF OR YOUR FAMILY DOWN: 3
SUM OF ALL RESPONSES TO PHQ9 QUESTIONS 1 & 2: 6
8. MOVING OR SPEAKING SO SLOWLY THAT OTHER PEOPLE COULD HAVE NOTICED. OR THE OPPOSITE, BEING SO FIGETY OR RESTLESS THAT YOU HAVE BEEN MOVING AROUND A LOT MORE THAN USUAL: 0
SUM OF ALL RESPONSES TO PHQ QUESTIONS 1-9: 25
3. TROUBLE FALLING OR STAYING ASLEEP: 2
SUM OF ALL RESPONSES TO PHQ QUESTIONS 1-9: 17
SUM OF ALL RESPONSES TO PHQ QUESTIONS 1-9: 25
10. IF YOU CHECKED OFF ANY PROBLEMS, HOW DIFFICULT HAVE THESE PROBLEMS MADE IT FOR YOU TO DO YOUR WORK, TAKE CARE OF THINGS AT HOME, OR GET ALONG WITH OTHER PEOPLE: 0
6. FEELING BAD ABOUT YOURSELF - OR THAT YOU ARE A FAILURE OR HAVE LET YOURSELF OR YOUR FAMILY DOWN: 3
SUM OF ALL RESPONSES TO PHQ QUESTIONS 1-9: 27
SUM OF ALL RESPONSES TO PHQ QUESTIONS 1-9: 17
SUM OF ALL RESPONSES TO PHQ9 QUESTIONS 1 & 2: 6
8. MOVING OR SPEAKING SO SLOWLY THAT OTHER PEOPLE COULD HAVE NOTICED. OR THE OPPOSITE, BEING SO FIGETY OR RESTLESS THAT YOU HAVE BEEN MOVING AROUND A LOT MORE THAN USUAL: 3
4. FEELING TIRED OR HAVING LITTLE ENERGY: 3
SUM OF ALL RESPONSES TO PHQ QUESTIONS 1-9: 27
1. LITTLE INTEREST OR PLEASURE IN DOING THINGS: 3
6. FEELING BAD ABOUT YOURSELF - OR THAT YOU ARE A FAILURE OR HAVE LET YOURSELF OR YOUR FAMILY DOWN: 2
2. FEELING DOWN, DEPRESSED OR HOPELESS: 3
SUM OF ALL RESPONSES TO PHQ QUESTIONS 1-9: 17
5. POOR APPETITE OR OVEREATING: 2
4. FEELING TIRED OR HAVING LITTLE ENERGY: 3

## 2023-09-29 ASSESSMENT — PAIN DESCRIPTION - DESCRIPTORS: DESCRIPTORS: ACHING;DISCOMFORT;SHARP

## 2023-09-29 ASSESSMENT — PAIN - FUNCTIONAL ASSESSMENT
PAIN_FUNCTIONAL_ASSESSMENT: NONE - DENIES PAIN
PAIN_FUNCTIONAL_ASSESSMENT: ACTIVITIES ARE NOT PREVENTED

## 2023-09-29 ASSESSMENT — LIFESTYLE VARIABLES
HOW MANY STANDARD DRINKS CONTAINING ALCOHOL DO YOU HAVE ON A TYPICAL DAY: PATIENT DOES NOT DRINK
HOW OFTEN DO YOU HAVE A DRINK CONTAINING ALCOHOL: NEVER
HOW OFTEN DO YOU HAVE A DRINK CONTAINING ALCOHOL: 2-4 TIMES A MONTH
HOW MANY STANDARD DRINKS CONTAINING ALCOHOL DO YOU HAVE ON A TYPICAL DAY: 1 OR 2

## 2023-09-29 ASSESSMENT — PAIN DESCRIPTION - LOCATION: LOCATION: HEAD

## 2023-09-29 ASSESSMENT — PAIN DESCRIPTION - PAIN TYPE: TYPE: ACUTE PAIN

## 2023-09-29 ASSESSMENT — SLEEP AND FATIGUE QUESTIONNAIRES
AVERAGE NUMBER OF SLEEP HOURS: 4
SLEEP PATTERN: DIFFICULTY FALLING ASLEEP;DISTURBED/INTERRUPTED SLEEP
DO YOU HAVE DIFFICULTY SLEEPING: YES
DO YOU USE A SLEEP AID: NO

## 2023-09-29 ASSESSMENT — PAIN SCALES - GENERAL
PAINLEVEL_OUTOF10: 0
PAINLEVEL_OUTOF10: 4

## 2023-09-29 ASSESSMENT — PAIN DESCRIPTION - FREQUENCY: FREQUENCY: CONTINUOUS

## 2023-09-29 NOTE — ED NOTES
Left wrist cleaned, bacitracin and telfa applied, secured with kerlix, pt tolerated well     Jose Jones RN  09/29/23 [id number]

## 2023-09-29 NOTE — PROGRESS NOTES
[id number] [facility] Nursing Admission Note    Reason for Admission: Per [facility] note: PT states reason for ED visit, \" Suicidal thoughts since a teenager and has exacerbated past couple of weeks . \" Current stressors reported are relational discord with spouse, job, and her mother abusing drugs. Suicidal thought to wreck vehicle, or by overdose, and recently talking herself out of it. Denies past attempts, inpatient, and does receive treatment at [address] [facility]. Reports decrease in sleep, night - tripp, and isolating. History of self- mutilating behaviors as an adolescent, and past couple of weeks started burning self , \" Was not getting the release she needed. \" Tells in past would hit her thighs or punch things. Reports positive family history of substance abuse and mental illness. Reports often feeling like a burden to others, and feeling helpless and hopeless. Reports that she was tested and may have diagnosis of Borderline Personality Disorder, per patient. Patient Blood- Pressure 189/98 upon arrival, , stable, prior to  arrival to [facility]. Denies any substance abuse, urine drug screen positive for benzodiazepines, prescribd, per patient    Additional Notes:        Patient Active Problem List   Diagnosis    Suicidal behavior with attempted self-injury ([address])       Suicidal Ideation: yes. If yes, is there a plan? (Describe) no  Risk of Suicide: Moderate Risk  Homicidal Ideation:  no.  If yes, describe: Auditory/Visual Hallucinations:  no.    If yes, describe AVH?      Addictive Behavior:   Addictive Behavior  In the Past 3 Months, Have You Felt or Has Someone Told You That You Have a Problem With  : Eating (too much/too little)    Mental Status EXAM:  Mental Status and Behavioral Exam  Normal: No  Level of Assistance: Independent/Self  Facial Expression: Avoids Gaze, Flat, Sad  Affect: Congruent  Level of Consciousness: Alert  Frequency of Checks: 4 times per hour, close  Mood:Normal: No  Mood: Depressed, Anxious, Sad  Motor

## 2023-09-29 NOTE — ED NOTES
Pt changed to paper scrubs, belongings given to security, pt aware of plan of care     James Coffman RN  09/29/23 [id number]

## 2023-09-29 NOTE — PROGRESS NOTES
Patient arrived to the unit via wheelchair from the ER. Patient is alert and orientated. Vital signs obtained.

## 2023-09-29 NOTE — PROGRESS NOTES
[facility] ADULT INITIAL INTAKE ASSESSMENT     9/29/23    Kimmy Alvarez ,a 34 y.o. female, presents to the ED for a psychiatric assessment. ED Arrival time: [address]  ED physician:  MARLEN GARCIA Notification time: 1410  [facility] Assessment start time: [facility]  Psychiatrist call time: [phone] with Dr. Shaina Leo    Patient is referred by: Drove self. Reason for visit to ED - Presenting problem:     PT states reason for ED visit, \" Suicidal thoughts since a teenager and has exacerbated past couple of weeks . \" Current stressors reported are relational discord with spouse, job, and her mother abusing drugs. Suicidal thought to wreck vehicle, or by overdose, and recently talking herself out of it. Denies past attempts, inpatient, and does receive treatment at [address] [place] [address]. Reports decrease in sleep, night - tripp, and isolating. History of self- mutilating behaviors as an adolescent, and past couple of weeks started burning self , \" Was not getting the release she needed. \" Tells in past would hit her thighs or punch things. Reports positive family history of substance abuse and mental illness. Reports often feeling like a burden to others, and feeling helpless and hopeless. Reports that she was tested and may have diagnosis of Borderline Personality Disorder, per patient. Patient Blood- Pressure 189/98 upon arrival, , stable, prior to  arrival to [facility]. Denies any substance abuse, urine drug screen positive for benzodiazepines, prescribd, per patient. Ed. Provider Notes:  Si sine yesterday. No plan. Depresion. Burned left wrist today and yesterday. Declineds pain meds. Last tetanus unkonwn. Takes depression meds. Denies etoh/drugs.    Duration of symptoms: Recent    Current Stressors: family    C-SSRS Completed: yes  Risk Assessment Completed:yes  Risk of Suicide: Low Risk  Provider notified of the C-SSRS Screening and Risk Assessment Findings:yes    SI:  admits to   Plan: yes   If yes, describe:  Past SI attempts:

## 2023-09-30 PROBLEM — F33.2 MAJOR DEPRESSIVE DISORDER, RECURRENT SEVERE WITHOUT PSYCHOTIC FEATURES (HCC): Status: ACTIVE | Noted: 2023-09-30

## 2023-09-30 LAB
25(OH)D3 SERPL-MCNC: 24.8 NG/ML
CHOLEST SERPL-MCNC: 153 MG/DL (ref 160–199)
HBA1C MFR BLD: 5.2 % (ref 4–6)
HDLC SERPL-MCNC: 52 MG/DL (ref 65–121)
LDLC SERPL CALC-MCNC: 88 MG/DL
TRIGL SERPL-MCNC: 66 MG/DL (ref 0–149)
TSH SERPL DL<=0.005 MIU/L-ACNC: 0.63 UIU/ML (ref 0.35–5.5)
VIT B12 SERPL-MCNC: 925 PG/ML (ref 211–946)

## 2023-09-30 PROCEDURE — 6370000000 HC RX 637 (ALT 250 FOR IP): Performed by: FAMILY MEDICINE

## 2023-09-30 PROCEDURE — 6370000000 HC RX 637 (ALT 250 FOR IP): Performed by: PSYCHIATRY & NEUROLOGY

## 2023-09-30 PROCEDURE — 83036 HEMOGLOBIN GLYCOSYLATED A1C: CPT

## 2023-09-30 PROCEDURE — 82306 VITAMIN D 25 HYDROXY: CPT

## 2023-09-30 PROCEDURE — 36415 COLL VENOUS BLD VENIPUNCTURE: CPT

## 2023-09-30 PROCEDURE — 80061 LIPID PANEL: CPT

## 2023-09-30 PROCEDURE — 82607 VITAMIN B-12: CPT

## 2023-09-30 PROCEDURE — 84443 ASSAY THYROID STIM HORMONE: CPT

## 2023-09-30 PROCEDURE — 90792 PSYCH DIAG EVAL W/MED SRVCS: CPT | Performed by: PSYCHIATRY & NEUROLOGY

## 2023-09-30 PROCEDURE — 1240000000 HC EMOTIONAL WELLNESS R&B

## 2023-09-30 RX ORDER — ERGOCALCIFEROL 1.25 MG/1
50000 CAPSULE ORAL WEEKLY
Status: DISCONTINUED | OUTPATIENT
Start: 2023-09-30 | End: 2023-10-02 | Stop reason: HOSPADM

## 2023-09-30 RX ORDER — BUPROPION HYDROCHLORIDE 150 MG/1
150 TABLET ORAL DAILY
Status: DISCONTINUED | OUTPATIENT
Start: 2023-09-30 | End: 2023-10-02 | Stop reason: HOSPADM

## 2023-09-30 RX ORDER — DOXEPIN HYDROCHLORIDE 50 MG/1
50 CAPSULE ORAL NIGHTLY
Status: DISCONTINUED | OUTPATIENT
Start: 2023-09-30 | End: 2023-10-02 | Stop reason: HOSPADM

## 2023-09-30 RX ORDER — FLUOXETINE HYDROCHLORIDE 20 MG/1
40 CAPSULE ORAL DAILY
Status: DISCONTINUED | OUTPATIENT
Start: 2023-09-30 | End: 2023-10-02 | Stop reason: HOSPADM

## 2023-09-30 RX ADMIN — DOXEPIN HYDROCHLORIDE 50 MG: 50 CAPSULE ORAL at 21:57

## 2023-09-30 RX ADMIN — Medication 5 MG: at 21:56

## 2023-09-30 RX ADMIN — MUPIROCIN: 20 OINTMENT TOPICAL at 17:02

## 2023-09-30 RX ADMIN — ERGOCALCIFEROL 50000 UNITS: 1.25 CAPSULE ORAL at 14:23

## 2023-09-30 RX ADMIN — HYDROXYZINE HYDROCHLORIDE 25 MG: 25 TABLET, FILM COATED ORAL at 21:56

## 2023-09-30 RX ADMIN — FLUOXETINE 40 MG: 20 CAPSULE ORAL at 14:21

## 2023-09-30 RX ADMIN — BUPROPION HYDROCHLORIDE 150 MG: 150 TABLET, EXTENDED RELEASE ORAL at 14:23

## 2023-09-30 RX ADMIN — MUPIROCIN: 20 OINTMENT TOPICAL at 23:00

## 2023-09-30 NOTE — PROGRESS NOTES
Admission Note      Reason for admission/Target Symptom: Per nursing admission assessment - Reason for Admission: Per GOLD note: PT states reason for ED visit, \" Suicidal thoughts since a teenager and has exacerbated past couple of weeks . \" Current stressors reported are relational discord with spouse, job, and her mother abusing drugs. Suicidal thought to wreck vehicle, or by overdose, and recently talking herself out of it. Denies past attempts, inpatient, and does receive treatment at [address] [facility]. Reports decrease in sleep, night - tripp, and isolating. History of self- mutilating behaviors as an adolescent, and past couple of weeks started burning self , \" Was not getting the release she needed. \" Tells in past would hit her thighs or punch things. Reports positive family history of substance abuse and mental illness. Reports often feeling like a burden to others, and feeling helpless and hopeless. Reports that she was tested and may have diagnosis of Borderline Personality Disorder, per patient. Patient Blood- Pressure 189/98 upon arrival, , stable, prior to  arrival to [facility]. Denies any substance abuse, urine drug screen positive for benzodiazepines, prescribd, per patient    Diagnoses: Depression NOS   UDS: Benzodiazepine  BAL:  <10    SW will meet with treatment team to discuss patient's treatment including care planning, discharge planning, and follow-up needs. Patient has been admitted to [facility] Unit. Treatment team will identify the patient's discharge needs. Appointments will be made for medication management and outpatient therapy/counseling. Pt confirmed the need for ongoing treatment post inpatient stay. Pt was also provided a handout of contact information for drug and alcohol treatment centers and other community support service such as [facility], [facility], and [facility].

## 2023-09-30 NOTE — H&P
HISTORY and PHYSICAL      CHIEF COMPLAINT:  SI    Reason for Admission:  SI    History Obtained From:  patient, chart    HISTORY OF PRESENT ILLNESS:      The patient is a 34 y.o. female who is admitted to the [address] unit with worsening mood issues. She has no c/o chest pain or SOA. She has no abdominal pain or N/V. She has no dysuria. She has had no fevers. She did burn her wrist before admission, intentionally. Past Medical History:    History reviewed. No pertinent past medical history. Past Surgical History:        Procedure Laterality Date    CHOLECYSTECTOMY      WRIST SURGERY           Medications Prior to Admission:    Medications Prior to Admission: ergocalciferol (ERGOCALCIFEROL) 16700 units capsule, Take 50,000 Units by mouth  phentermine 37.5 MG capsule, Take 37.5 mg by mouth every morning  metFORMIN (GLUCOPHAGE) 500 MG tablet, Take 500 mg by mouth daily  etonogestrel-ethinyl estradiol (NUVARING) 0.12-0.015 MG/24HR vaginal ring, Place 1 each vaginally See Admin Instructions  oxyCODONE-acetaminophen (PERCOCET) 5-325 MG per tablet, Take 1 tablet by mouth every 4 hours as needed for Pain . cyclobenzaprine (FLEXERIL) 10 MG tablet, Take 1 tablet by mouth 3 times daily as needed for Muscle spasms  ondansetron (ZOFRAN ODT) 4 MG disintegrating tablet, Take 1 tablet by mouth every 8 hours as needed for Nausea or Vomiting  naproxen (NAPROSYN) 500 MG tablet, Take 1 tablet by mouth 2 times daily    Allergies:  Amoxicillin and Pcn [penicillins]    Social History:   TOBACCO:   reports that she has never smoked. She has never used smokeless tobacco.  ETOH:   reports current alcohol use. DRUGS:   reports no history of drug use. Family History:   History reviewed. No pertinent family history.   REVIEW OF SYSTEMS:  Constitutional: neg  CV: neg  Pulmonary: neg  GI: neg  : neg  Psych: SI  Neuro: neg  Skin: neg  MusculoSkeletal: neg  HEENT: neg  Joints:
09/29/2023    LABALBU 4.3 09/29/2023    BILITOT 0.3 09/29/2023    ALKPHOS 116 (H) 09/29/2023    AST 15 09/29/2023    ALT 11 09/29/2023    LABGLOM >60 09/29/2023           ASSESSMENT AND PLAN:  DSM-5 DIAGNOSIS:   Impression:  Major depressive disorder, recurrent, severe without psychotic features  Generalized anxiety disorder  Insomnia unspecified    Patient is meeting the criteria for inpatient psychiatric treatment. Plan:   -Admit to [facility] Unit and monitor on 15 minute checks. Suicide precautions.  Shellia Spore reviewed. -Gather collateral information from family with release.  -Medical monitoring to be performed by Dr. Prosper Batista and associates. Order routine labs. -Acclimate to the unit. Provide supportive psychotherapy.  -Encourage participation in groups and therapeutic activities as appropriate. Work on coping skills. -Medications:    Continue Prozac and add Wellbutrin for depression. Discussed benefits, alternatives, and risks including but not limited to black box warning regarding increase in suicidality, worsening anxiety/depression, hypertension, tachycardia, headache, nausea/GI upset, agitation, dizziness, wt. loss, constipation/diarrhea, insomnia/fatigue, very rare risk of precipitation of fei in patients with bipolar disorder. Pt. states they have no seizure history, warned of risk of lowered seizure threshold, & thus, increased risk of seizures on medication. Discussed medication may take 6 to 8 weeks for full effect. Atarax as needed for anxiety. Discussed benefits, alternatives, and risks including but not limited to wheezing, dyspnea, seizures, dry mouth, dizziness, increased fall risk, agitation, nausea. Advised caution in operating vehicles/machinery after taking, especially if sedation occurs. Doxepin for insomnia.  Discussed alternatives, benefits, & risks with patient including - but not limited to - black box warning regarding suicidality (in people under age 25 and decreased risk in

## 2023-09-30 NOTE — PROGRESS NOTES
Behavioral Services  Medicare Certification Upon Admission    I certify that this patient's inpatient psychiatric hospital admission is medically necessary for:    [x] (1) Treatment which could reasonably be expected to improve this patient's condition,       [x] (2) Or for diagnostic study;     AND     [x](2) The inpatient psychiatric services are provided while the individual is under the care of a physician and are included in the individualized plan of care.     Estimated length of stay/service 3-5 days    Plan for post-hospital care TBA    Electronically signed by Humberto Long MD on 9/30/2023 at 10:28 AM

## 2023-09-30 NOTE — PROGRESS NOTES
Problem: Self Care Deficits Care Plan (Adult)  Goal: *Acute Goals and Plan of Care (Insert Text)  Occupational Therapy Goals  Initiated 7/30/2018  1. Patient will perform self feeding with independence within 7 day(s). 2.  Patient will perform standing grooming task at sink with minimal assistance/contact guard assist within 7 day(s). 3.  Patient will perform upper and lower body dressing with minimal assistance/contact guard assist within 7 day(s). 4.  Patient will perform toilet transfers in bathroom with least restrictive DME with minimal assistance/contact guard assist within 7 day(s). 5.  Patient will perform all aspects of toileting with minimal assistance/contact guard assist within 7 day(s). Occupational Therapy Goals  Initiated 7/30/2018  1. Patient will perform self feeding with independence within 7 day(s). 2.  Patient will perform standing grooming task at sink with minimal assistance/contact guard assist within 7 day(s). 8/2/2018. Achieved. Upgrade to [facility] and pt will walk into the bathroom to perform task. 3.  Patient will perform upper and lower body dressing with minimal assistance/contact guard assist within 7 day(s). 4.  Patient will perform toilet transfers in bathroom with least restrictive DME with minimal assistance/contact guard assist within 7 day(s). 5.  Patient will perform all aspects of toileting with minimal assistance/contact guard assist within 7 day(s). Occupational Therapy TREATMENT  Patient: David Dunn (89 y.o. female)  Date: 8/2/2018  Diagnosis: Asthma exacerbation <principal problem not specified>       Precautions: Bed Alarm, Fall  Chart, occupational therapy assessment, plan of care, and goals were reviewed. ASSESSMENT:  Pt is progressing toward goals. Will continue goals for consistency and continue to assess. Pt was admitted to ED on 7/26 then to ICU was intubated, then extubated on 7/30 and transferred out of ICU.   On 7/31 pt transferred to ICU SW met with patient to complete psychosocial and lifetime CSSR-S on this date. Patients long and short-term goals discussed. Patient voiced understanding. Treatment plan sheet signed. Patient verbalized understanding of the treatment plan. Patient participated in goals and objectives of the treatment plan. Patient discussed safety plan with . In the last 6 months has the patient been a danger to self: Yes  In the last 6 months has the patient been a danger to others: No  Legal Guardian/POA: No    Activity Assessment:  Skills: Being a student   Talents:  Communications skills and being a student   Interests: cosmetology and animals     Provided patient with [employer] handout entitled \"Quitting Smoking. \"  Reviewed handout with patient: addressing dangers of smoking, developing coping skills, and providing basic information about quitting. Patient received all components practical counseling of tobacco practical counseling during the hospital stay. due to multiple seizures/ pseudo seizure. Pt seen today and demonstrated progression toward established goals. Pt was able to perform bed mobility with Supervision and ambulated to the toilet with Min A X2, performing toileting with Supervision and standing grooming with CGA. Pt performed ambulation in room and in narayan (using cardiac cart) demonstrating decreased balance and unsteadiness, requiring Min A X 1 to 2,  impairing independence and safety. Recommend that pt be discharged to inpatient rehab to increase independence and safety. Progression toward goals:  []       Improving appropriately and progressing toward goals  [x]       Improving slowly and progressing toward goals  []       Not making progress toward goals and plan of care will be adjusted     PLAN:  Patient continues to benefit from skilled intervention to address the above impairments. Continue treatment per established plan of care. Discharge Recommendations:  Inpatient Rehab  Further Equipment Recommendations for Discharge:  tbd     SUBJECTIVE:   Patient stated Arie Delgado I get some ensure? .... I just can't eat.     OBJECTIVE DATA SUMMARY:   Cognitive/Behavioral Status:  Neurologic State: Alert  Orientation Level: Oriented to person;Oriented to place  Cognition: Follows commands  Perception: Appears intact  Perseveration: No perseveration noted  Safety/Judgement: Home safety    Functional Mobility and Transfers for ADLs:  Bed Mobility:  Supine to Sit: SupervisionScooting: Stand-by assistance    Transfers:  Sit to Stand: Minimum assistance (poor balance with inital standing)          Balance:  Sitting: Impaired  Sitting - Static: Good (unsupported)   Sitting - Dynamic:  (not tested   Standing: Impaired   Standing - Static: Constant support; Fair   Standing - Dynamic : Poor   ADL Intervention:   Pt was agreeable to tx.   Performed bed mobility with S, sat EOB with good balance, reported dizziness in standing (VS stable,) balance was initiatlly poor. Ambulated with CGA/Min A for toilet transfer and needed S for toileting. CGA/Min A for standing grooming. Pt educated in safe technique during adls and monitored vitals. Pt reported that her  dizziness subsided and no further complaints during tx session. Cognitive Retraining  Safety/Judgement: Home safety            Therapeutic Exercises:   Encouraged OOB sitting in the chair. Pain:  Pain Scale 1: Numeric (0 - 10)  Pain Intensity 1: pt did not r ate her pain          Activity Tolerance:   VSS  Throughout tx session  Please refer to the flowsheet for vital signs taken during this treatment.   After treatment:   [x] Patient left in no apparent distress sitting up in chair  [] Patient left in no apparent distress in bed  [x] Call bell left within reach  [x] Nursing notified  [] Caregiver present  [x] Bed alarm activated    COMMUNICATION/COLLABORATION:   The patients plan of care was discussed with: Physical Therapist and Registered Nurse    Tristen Marion OTR/L  Time Calculation: 35 mins

## 2023-09-30 NOTE — PROGRESS NOTES
Group Note    Date: 09/30/23  Start Time: 8:00 AM   End Time:8:30 AM     Number of Participants: 12    Type of Group: Community/Goal     Patient's Goal:  \"Not sure what to expect\"    Notes:      Status After Intervention:      Participation Level:  Active Listener    Participation Quality: Appropriate    Speech:  normal    Thought Process/Content: Logical    Mood:  Calm    Level of consciousness:  Alert    Response to Learning: Able to verbalize current knowledge/experience    Modes of Intervention: Education and Support    Discipline Responsible: Behavioral Health Technician     Signature:  Rosalina Kaur

## 2023-10-01 PROCEDURE — 1240000000 HC EMOTIONAL WELLNESS R&B

## 2023-10-01 PROCEDURE — 6370000000 HC RX 637 (ALT 250 FOR IP): Performed by: PSYCHIATRY & NEUROLOGY

## 2023-10-01 RX ADMIN — Medication 5 MG: at 21:22

## 2023-10-01 RX ADMIN — BUPROPION HYDROCHLORIDE 150 MG: 150 TABLET, EXTENDED RELEASE ORAL at 08:23

## 2023-10-01 RX ADMIN — DOXEPIN HYDROCHLORIDE 50 MG: 50 CAPSULE ORAL at 21:22

## 2023-10-01 RX ADMIN — FLUOXETINE 40 MG: 20 CAPSULE ORAL at 08:22

## 2023-10-01 NOTE — RESEARCH
Collateral obtained from: Lucille Cox () [phone]    Immediate Stressors & Time Episode Began: Emily Maloney reports \"I think she got overwhelmed with everything. She has had a rough time with her mother and I think was when it was her birthday and when she interacts with her mother it bothers her. Her mother stood her up on her birthday. Her mothers actions have proven that it really is not her mother anymore. Melinda Braxton was put on medications and she said it made her feel weird. She was on Buspar and klonopin I believe. She does not like it and it makes her feel weird. She is seeing a therapist at [facility]. She tells the therapist more than what she tells me is what she has said. The therapist asked Melinda Braxton why she was still with him. \"  does not understand why she said that. He reports that this is the second therapist.  reports that she cancelled her appointment on Friday and he is unsure if she just did not want to go or if she cancelled it.  reports that he over communicates and she under communicates so their relationship has been on and off in the past.    Diagnosis/Hx of compliance with meds:  reports that Melinda Braxton said that they were looking at her being dx with a personality disorder and possibility bipolar.  is unaware if she is compliant with medication but reports that she has a pill. Tx Hx/Past hospitalizations:  caller reports no maurice treatment history    Family hx of psychiatric issues: caller reports family history of psychiatric issues -- history includes Bipolar    Substance Abuse: caller reports no substance abuse history    Pending Legal: caller reports no pending legal issues    Safety Issues (Weapons? Hx of attempts): The importance of locking weapons in a secured location was explained and recommended to collateral caller. Support system/Medication Managed by:  The importance of medication management and locking extra medication in a secured

## 2023-10-01 NOTE — PROGRESS NOTES
Group Note    Date: 09/30/23  Start Time: 7:20 PM   End Time:7:50 PM     Number of Participants: 12    Type of Group: Wrap-Up     Patient's Goal:  reflect on today    Notes:  see wrap-up sheet    Status After Intervention:  Unchanged    Participation Level:  Active Listener    Participation Quality: Appropriate    Speech:  normal    Thought Process/Content: Linear    Mood:  pleasant, cooperative    Level of consciousness:  Alert and Oriented x4    Response to Learning: Able to retain information    Modes of Intervention: Education and Support    Discipline Responsible: Registered Nurse     Signature:  Deloris Garcia RN

## 2023-10-01 NOTE — PROGRESS NOTES
Group Note    Date: 10/01/23  Start Time: 8:00 AM   End Time:8:30 AM     Number of Participants: 15    Type of Group: Community/Goal     Patient's Goal:  \"Be more social\"    Notes:      Status After Intervention:      Participation Level:  Active Listener    Participation Quality: Appropriate    Speech:  normal    Thought Process/Content: Logical    Mood:  Calm    Level of consciousness:  Alert    Response to Learning: Able to verbalize current knowledge/experience    Modes of Intervention: Education and Support    Discipline Responsible: Behavioral Health Technician     Signature:  Waqar Fernando

## 2023-10-02 VITALS
HEART RATE: 84 BPM | SYSTOLIC BLOOD PRESSURE: 143 MMHG | BODY MASS INDEX: 51.58 KG/M2 | TEMPERATURE: 97 F | RESPIRATION RATE: 16 BRPM | DIASTOLIC BLOOD PRESSURE: 93 MMHG | HEIGHT: 63 IN | OXYGEN SATURATION: 95 %

## 2023-10-02 PROCEDURE — 5130000000 HC BRIDGE APPOINTMENT

## 2023-10-02 PROCEDURE — 6370000000 HC RX 637 (ALT 250 FOR IP): Performed by: PSYCHIATRY & NEUROLOGY

## 2023-10-02 RX ORDER — DOXEPIN HYDROCHLORIDE 50 MG/1
50 CAPSULE ORAL NIGHTLY
Qty: 30 CAPSULE | Refills: 1 | Status: SHIPPED | OUTPATIENT
Start: 2023-10-02

## 2023-10-02 RX ORDER — BUPROPION HYDROCHLORIDE 150 MG/1
150 TABLET ORAL DAILY
Qty: 30 TABLET | Refills: 1 | Status: SHIPPED | OUTPATIENT
Start: 2023-10-03

## 2023-10-02 RX ORDER — FLUOXETINE HYDROCHLORIDE 40 MG/1
40 CAPSULE ORAL DAILY
Qty: 30 CAPSULE | Refills: 1 | Status: SHIPPED | OUTPATIENT
Start: 2023-10-03

## 2023-10-02 RX ORDER — ERGOCALCIFEROL 1.25 MG/1
50000 CAPSULE ORAL WEEKLY
Qty: 11 CAPSULE | Refills: 0 | Status: SHIPPED | OUTPATIENT
Start: 2023-10-07 | End: 2023-12-17

## 2023-10-02 RX ORDER — HYDROXYZINE HYDROCHLORIDE 25 MG/1
25 TABLET, FILM COATED ORAL 3 TIMES DAILY PRN
Qty: 90 TABLET | Refills: 1 | Status: SHIPPED | OUTPATIENT
Start: 2023-10-02

## 2023-10-02 RX ORDER — MECOBALAMIN 5000 MCG
5 TABLET,DISINTEGRATING ORAL NIGHTLY
Qty: 30 TABLET | Refills: 1 | Status: SHIPPED | OUTPATIENT
Start: 2023-10-02

## 2023-10-02 RX ADMIN — FLUOXETINE 40 MG: 20 CAPSULE ORAL at 08:27

## 2023-10-02 RX ADMIN — BUPROPION HYDROCHLORIDE 150 MG: 150 TABLET, EXTENDED RELEASE ORAL at 08:27

## 2023-10-02 NOTE — PROGRESS NOTES
Treatment Team Note:    Target Symptoms/Reason for admission: Per nursing admission assessment - Reason for Admission: Per GOLD note: PT states reason for ED visit, \" Suicidal thoughts since a teenager and has exacerbated past couple of weeks . \" Current stressors reported are relational discord with spouse, job, and her mother abusing drugs. Suicidal thought to wreck vehicle, or by overdose, and recently talking herself out of it. Denies past attempts, inpatient, and does receive treatment at [facility]. Reports decrease in sleep, night - tripp, and isolating. History of self- mutilating behaviors as an adolescent, and past couple of weeks started burning self , \" Was not getting the release she needed. \" Tells in past would hit her thighs or punch things. Reports positive family history of substance abuse and mental illness. Reports often feeling like a burden to others, and feeling helpless and hopeless. Reports that she was tested and may have diagnosis of Borderline Personality Disorder, per patient. Patient Blood- Pressure 189/98 upon arrival, , stable, prior to  arrival to [facility]. Denies any substance abuse, urine drug screen positive for benzodiazepines, prescribd, per patient    Diagnoses per psych provider: Depression with suicidal ideation [F32. A, R45.851]  Suicidal behavior with attempted self-injury ([address]) [T14.91XA]  Major depressive disorder, recurrent severe without psychotic features ([address]) [F33.2]    Therapist met with treatment team to discuss patients treatment and discharge plans.     Patient's aftercare plan is:  [facility]    Aftercare appointments made: Yes    Pt lives with: spouse    Collateral obtained from:  Pt's , Brandy Caputo  Collateral obtained on:10/01/23    Attending groups: Yes    Behavior: cooperative, social with peers/staff, affect is bright, good eye contact    Has patient been

## 2023-10-02 NOTE — DISCHARGE INSTRUCTIONS
Medications:   Medication Summary Provided. I understand that I should take only the medications on my list.   --why and when I need to take each medication. --which side effects to watch for.   --that I should carry my medication information at all times in case of emergency    Situations. --I will take all medications until discontinued by physician. I will take all my medications to follow up appointments. --check with my physician or pharmacist before taking any new medication, over    the counter product or drink alcohol.   --ask about food, drug and dietary supplement interactions. --discard old lists and update records with medication providers. Behavior Health Follow Up:  Original Referral Source: ED  Discharge Diagnosis: [unfilled]  Recomendations for Level of Care: Follow Up  Patient Status at Discharge: Stable  [facility]  was: Aftercare plan faxed:    Faxed by: Social Work staff Margarette   Date: 10/02/23   Time: SW to fax  Prescriptions sent with pt.     General Information:   Questions regarding your bill: Call [phone]   Suicide Hotline (Rescue Crisis) 9-876.236.6713   To obtain results of pending studies call Medical Records at: [phone]   For emergencies and 24 hour/7 days a week contact information: [phone]

## 2023-10-02 NOTE — PROGRESS NOTES
Discharge Note     Patient is discharging on this date. Patient denies SI, HI, and AVH at this time. Patient reports improvement in behavior and is leaving unit in overall good condition. SW and patient discussed patient's follow up appointments and importance of attending appointments as scheduled, patient voiced understanding and agreement. Patient and SW also discussed patient's safety plan and patient was able to verbally identify: warning signs, coping strategies, places and people that help make the patient feel better/distract negative thoughts, friends/family/agencies/professionals the patient can reach out to in a crisis, and something that is important to the patient/worth living for. Patient was provided the national suicide prevention hotline number (5-271-164-728-761-7924) as well as local community behavioral health [facility]) crisis number for emergencies (6-838-241-260-676-7354). Discharge Disposition: home -lives with family      Pt to follow up with:  Yoselin Frost on October 9 , 2023 at 10:00 AM for the intake appointment.  AMADO Arvizu  Referral to outpatient tobacco cessation counseling treatment:  Patient refused referral to outpatient tobacco cessation counseling    SW offered to assist patient with transportation, patient declined transportation assistance

## 2023-10-02 NOTE — DISCHARGE INSTR - DIET

## 2023-10-02 NOTE — PROGRESS NOTES
Group Note    Date: 10/01/23  Start Time: 7:30 PM   End Time:8:00 PM     Number of Participants: 16    Type of Group: Wrap-Up     Patient's Goal:  make new friends    Notes:  see wrap up sheet    Status After Intervention:  Unchanged    Participation Level: Interactive    Participation Quality: Appropriate    Speech:  normal    Thought Process/Content: Logical    Mood: anxious    Level of consciousness:  Alert and Oriented x4    Response to Learning: Able to verbalize current knowledge/experience    Modes of Intervention: Education and Support    Discipline Responsible: Registered Nurse     Signature:  Tressa Martinez RN

## 2023-10-02 NOTE — DISCHARGE SUMMARY
NA    Consults: Internal medicine    Significant Diagnostic Studies:     Lab Results   Component Value Date    WBC 8.7 09/29/2023    HGB 13.1 09/29/2023    HCT 41.6 09/29/2023    MCV 87.4 09/29/2023     09/29/2023     Lab Results   Component Value Date     09/29/2023    K 4.4 09/29/2023     09/29/2023    CO2 27 09/29/2023    BUN 9 09/29/2023    CREATININE 0.9 09/29/2023    GLUCOSE 96 09/29/2023    CALCIUM 9.2 09/29/2023    PROT 7.2 09/29/2023    LABALBU 4.3 09/29/2023    BILITOT 0.3 09/29/2023    ALKPHOS 116 (H) 09/29/2023    AST 15 09/29/2023    ALT 11 09/29/2023    LABGLOM >60 09/29/2023       Lab Results   Component Value Date    REGTHETQ36 925 09/30/2023     Lab Results   Component Value Date    VITD25 24.8 (L) 09/30/2023       Treatments: therapies: RN and SW    Mental Status Examination:  Alert, Oriented X 4  Appearance:  Proper Grooming and Hygiene  Speech with Regular Rate and Rhythm  Eye Contact:  Good  No Psychomotor Agitation/Retardation Noted  Attitude:  Cooperative  Mood:  \"Good\"  Affective: Congruent, appropriate to the situation, with a normal range and intensity  Thought Processes:  Coherently communicated, logical and goal oriented  Thought Content:  No Suicidal Ideation, No Homicidal Ideation, No Auditory or Visual Hallucinations, No Overt Delusions  Insight: Improved  Judgement: Improved  Memory is intact for both remote and recent  Intellectual Functioning:  Within the [address] [place] [address] of Knowledge:  Adequate  Attention and Concentration:  Adequate      Discharge Exam:  Please, see medical note    Disposition: home    Patient Instructions:   Current Discharge Medication List        START taking these medications    Details   melatonin 5 MG TBDP disintegrating tablet Take 1 tablet by mouth nightly  Qty: 30 tablet, Refills: 1      doxepin (SINEQUAN) 50 MG capsule Take 1 capsule by mouth nightly  Qty: 30 capsule, Refills: 1      FLUoxetine (PROZAC) 40 MG capsule Take 1 capsule

## 2023-10-02 NOTE — PROGRESS NOTES
Behavioral Health   Discharge Note  Bridge Appointment completed: Reviewed Discharge Instructions with patient. Patient verbalizes understanding and agreement with the discharge plan using the teachback method. Referral for Outpatient Tobacco Cessation Counseling, upon discharge (kota X if applicable and completed):    ( )  Hospital staff assisted patient to call Quit Line or faxed referral                                   during hospitalization                  ( )  Recognizing danger situations (included triggers and roadblocks), if not completed on admission                    ( )  Coping skills (new ways to manage stress, exercise, relaxation techniques, changing routine, distraction), if not completed on admission                                                           ( )  Basic information about quitting (benefits of quitting, techniques in how to quit, available resources, if not completed on admission  ( ) Referral for counseling faxed to [address] [place] [address]   ( ) Patient refused referral  ( X) Patient refused counseling  ( X) Patient refused smoking cessation medication upon discharge    Vaccinations (kota X if applicable and completed):  ( ) Patient states already received influenza vaccine elsewhere  ( ) Patient received influenza vaccine during this hospitalization  ( X) Patient refused influenza vaccine at this time      Pt discharged with followings belongings:   Dental Appliances: None  Vision - Corrective Lenses: Eyeglasses  Hearing Aid: None  Jewelry: Body Piercing, Ring  Body Piercings Removed: Yes  Clothing: Pants, Shirt, Socks  Other Valuables: Wallet   Valuables retrieved from safe and returned to patient. Patient left department with staff  via ambulatory to her car  , discharged to home . Patient education on aftercare instructions: Yes  Patient verbalize understanding of AVS:  Yes. Suicidal Ideations? No Risk of Suicide: No Risk AVH? No HI?  Negative for homicidal ideation

## 2023-10-02 NOTE — PSYCHOTHERAPY
Group Therapy Note    Date: 10/2/2023  Start Time: 1330  End Time:  1400  Number of Participants: 9    Type of Group: SPIRITUALITY     Wellness Binder Information  Module Name:  Mindfulness  Session Number:      Patient's Goal:  To rest the mind    Notes:      Status After Intervention:  Improved    Participation Level: Interactive    Participation Quality: Appropriate, Attentive, and Sharing      Speech:  normal      Thought Process/Content:       Affective Functioning: Congruent      Mood: calm      Level of consciousness:  Oriented x4      Response to Learning: Able to verbalize current knowledge/experience and Capable of insight      Endings:     Modes of Intervention: Education and Activity      Discipline Responsible:       Signature:  Ezra Bautista MA [facility]

## 2023-11-16 RX ORDER — CHOLECALCIFEROL (VITAMIN D3) 125 MCG
1 CAPSULE ORAL
Qty: 28 TABLET | OUTPATIENT
Start: 2023-11-16

## 2023-11-16 RX ORDER — HYDROXYZINE HYDROCHLORIDE 25 MG/1
25 TABLET, FILM COATED ORAL 3 TIMES DAILY PRN
Qty: 84 TABLET | OUTPATIENT
Start: 2023-11-16

## 2024-01-18 RX ORDER — HYDROXYZINE HYDROCHLORIDE 25 MG/1
25 TABLET, FILM COATED ORAL 3 TIMES DAILY PRN
Qty: 84 TABLET | OUTPATIENT
Start: 2024-01-18

## (undated) DEVICE — ANOSCP PLSTC 9/10DX3 3/8IN

## (undated) DEVICE — Device: Brand: DEFENDO AIR/WATER/SUCTION AND BIOPSY VALVE

## (undated) DEVICE — CUFF,BP,DISP,1 TUBE,ADULT,HP: Brand: MEDLINE

## (undated) DEVICE — TBG SMPL FLTR LINE NASL 02/C02 A/ BX/100

## (undated) DEVICE — THE CHANNEL CLEANING BRUSH IS A NYLON FLEXI BRUSH ATTACHED TO A FLEXIBLE PLASTIC SHEATH DESIGNED TO SAFELY REMOVE DEBRIS FROM FLEXIBLE ENDOSCOPES.

## (undated) DEVICE — MASK,OXYGEN,MED CONC,ADLT,7' TUB, UC: Brand: PENDING

## (undated) DEVICE — YANKAUER,BULB TIP WITH VENT: Brand: ARGYLE

## (undated) DEVICE — ENDOGATOR AUXILIARY WATER JET CONNECTOR: Brand: ENDOGATOR

## (undated) DEVICE — SENSR O2 OXIMAX FNGR A/ 18IN NONSTR

## (undated) DEVICE — CONMED SCOPE SAVER BITE BLOCK, 20X27 MM: Brand: SCOPE SAVER

## (undated) DEVICE — FRCP BX RADJAW4 NDL 2.8 240 STD OG